# Patient Record
Sex: MALE | Race: WHITE | NOT HISPANIC OR LATINO | Employment: FULL TIME | URBAN - METROPOLITAN AREA
[De-identification: names, ages, dates, MRNs, and addresses within clinical notes are randomized per-mention and may not be internally consistent; named-entity substitution may affect disease eponyms.]

---

## 2020-02-02 ENCOUNTER — HOSPITAL ENCOUNTER (EMERGENCY)
Facility: CLINIC | Age: 55
Discharge: HOME | End: 2020-02-02
Attending: EMERGENCY MEDICINE | Admitting: EMERGENCY MEDICINE
Payer: COMMERCIAL

## 2020-02-02 VITALS — OXYGEN SATURATION: 100 % | SYSTOLIC BLOOD PRESSURE: 157 MMHG | TEMPERATURE: 98.3 F | DIASTOLIC BLOOD PRESSURE: 91 MMHG

## 2020-02-02 DIAGNOSIS — F43.10 PTSD (POST-TRAUMATIC STRESS DISORDER): ICD-10-CM

## 2020-02-02 DIAGNOSIS — F41.9 ANXIETY: ICD-10-CM

## 2020-02-02 PROCEDURE — 25000132 ZZH RX MED GY IP 250 OP 250 PS 637: Performed by: EMERGENCY MEDICINE

## 2020-02-02 PROCEDURE — 99283 EMERGENCY DEPT VISIT LOW MDM: CPT

## 2020-02-02 RX ORDER — CLONAZEPAM 0.5 MG/1
1 TABLET ORAL ONCE
Status: COMPLETED | OUTPATIENT
Start: 2020-02-02 | End: 2020-02-02

## 2020-02-02 RX ADMIN — CLONAZEPAM 1 MG: 0.5 TABLET ORAL at 16:36

## 2020-02-02 ASSESSMENT — ENCOUNTER SYMPTOMS
VOMITING: 0
DIARRHEA: 1
FEVER: 0

## 2020-02-02 NOTE — ED PROVIDER NOTES
History     Chief Complaint:  Medication Refill    HPI   Olman Condon is a 54 year old male who presents with request of refill of his Klonopin.  Patient has a history of anxiety and PTSD.  He is prescribed Klonopin 1 mg that he generally takes once a day as needed.  He ran out of his medications 3 days prior and has not had any Klonopin in the last 48 hours.  He states that he sometimes uses additional doses of the Klonopin to assist with symptoms.  He did this over the last 30 days thus running out of the medication early.  He does have a refill for which he can  but not until Thursday or 4 days from now.  He denies any other novel complaints.    Allergies:  Bee Venom  Meperidine    Medications:    Baclofen  Klonopin  Sertraline  Losartan  Meloxicam     Past Medical History:    Recurrent cold sores  Depression   Anxiety   ADD    Past Surgical History:    Appendectomy    Family History:    Stroke     Social History:  Marital Status:  Legally  [3]  Former smoker.   Current smokeless tobacco user.   Positive for rare alcohol use.   Negative for drug use.      Review of Systems   Constitutional: Negative for fever.   Gastrointestinal: Positive for diarrhea. Negative for vomiting.   All other systems reviewed and are negative.      Physical Exam     Patient Vitals for the past 24 hrs:   BP Temp Temp src Heart Rate SpO2   02/02/20 1603 (!) 157/91 -- -- -- --   02/02/20 1602 -- 98.3  F (36.8  C) Temporal 113 100 %      Physical Exam    General:   Pleasant, age appropriate.      Resting comfortably in the bed.  HEENT:    Oropharynx is moist.  Eyes:    Conjunctiva normal  Neck:    Supple, no meningismus.     CV:     Regular rate and rhythm.      No murmurs, rubs or gallops.    PULM:    Clear to auscultation bilateral.       No respiratory distress.      Good air exchange.  ABD:    Soft, non-tender, non-distended.       No rebound, guarding or rigidity.  MSK:     No gross deformity to all four  extremities.   LYMPH:   No cervical lymphadenopathy.  NEURO:   Alert and oriented x 3.      Speech is clear with no aphasia.     Normal muscular tone, no tremor.  Skin:    Warm, dry and intact.    Psych:    Mildly anxious     No delusions, hallucinations.     Judgement is appropriate.      Emergency Department Course   Interventions:  Klonopin, 1 mg, PO    Emergency Department Course:  I performed an exam of the patient, as described above.     Findings and plan explained to the Patient. Patient discharged home with instructions regarding supportive care, medications, and reasons to return. The importance of close follow-up was reviewed.     Impression & Plan      Medical Decision Making:    Olman Condon is a 54 year old male who presents with request of refill of his Klonopin as he has run out early and cannot get a refill for an additional 4 days.  I informed him that we do not refill these controlled medications from the ED and need to be refilled through his primary care physician.  I offered to give him a single dose in the ED to assist with his anxiety that is present.  He is comfortable calling his primary care physician for further direction tomorrow.    Diagnosis:    ICD-10-CM    1. Anxiety F41.9    2. PTSD (post-traumatic stress disorder) F43.10        Disposition:  discharged to home    Elsie Mica  2/2/2020   [facility] EMERGENCY DEPARTMENT       Vicente Tejada MD  02/02/20 0046

## 2020-02-02 NOTE — ED NOTES
"When RN entered room to provide PO medication, patient is upset with registration, telling her that the questions she is asking have nothing to do with why he is here today and refusing to answer questions.  Upon signing consent for treatment, patient has opted out of research, health information exchange, and sharing of his information by his insurance company.  Registration wants RN to explain to patient what all of these refusals mean.  Patient states \"I don't need her to tell me what they mean, I know all about HIPPA.\"  "

## 2020-02-02 NOTE — ED NOTES
"Upon having patient sign discharge instructions, the provider listed on patient's chart is not correct.  Patient states \"I want Elver off there, she was just a physical therapist that I saw for a back injury years ago.  I told that other lady that my doctor is Dr. Dean, and I even gave her his address.\"  RN approached registration and informed her that patient would like his provider name changed.  "

## 2020-02-02 NOTE — ED AVS SNAPSHOT
[facility] Emergency Department  [address]  [facility] [address]  Phone:  [phone]  Fax:  [phone]                                    Olman Condon   MRN: [id number]    Department:  [facility] Emergency Department   Date of Visit:  2/2/2020           After Visit Summary Signature Page    I have received my discharge instructions, and my questions have been answered. I have discussed any challenges I see with this plan with the nurse or doctor.    ..........................................................................................................................................  Patient/Patient Representative Signature      ..........................................................................................................................................  Patient Representative Print Name and Relationship to Patient    ..................................................               ................................................  Date                                   Time    ..........................................................................................................................................  Reviewed by Signature/Title    ...................................................              ..............................................  Date                                               Time          22EPIC Rev 08/18

## 2020-02-02 NOTE — ED TRIAGE NOTES
Pt here with request for 1 mg Klonipin daily for PTSD. Unable to see MD until tomorrow. No SI/HI. ABC intact.

## 2020-03-13 ENCOUNTER — NURSE TRIAGE (OUTPATIENT)
Dept: NURSING | Facility: CLINIC | Age: 55
End: 2020-03-13

## 2020-03-13 NOTE — TELEPHONE ENCOUNTER
"Lower lumbar spinal stenosis - sees surgeon tomorrow at [facility]. Also missing discs in back. Back pain 9/10 tonight - is unsure if he should go into ED tonight or wait and see the surgeon in the morning. Per protocol, advised ED evaluation. Patient unsure - will try to \"wait it out\" until morning.    Karen Valadez RN on 3/13/2020 at 1:13 AM    Reason for Disposition    [1] SEVERE abdominal pain AND [2] present > 1 hour    Additional Information    Negative: Passed out (i.e., lost consciousness, collapsed and was not responding)    Negative: Shock suspected (e.g., cold/pale/clammy skin, too weak to stand, low BP, rapid pulse)    Negative: Sounds like a life-threatening emergency to the triager    Negative: Major injury to the back (e.g., MVA, fall > 10 feet or 3 meters, penetrating injury, etc.)    Negative: Followed a tailbone injury    Negative: [1] Pain in the upper back over the ribs (rib cage) AND [2] radiates (travels, goes) into chest    Negative: [1] Pain in the upper back over the ribs (rib cage) AND [2] worsened by coughing (or clearly increases with breathing)    Negative: Back pain during pregnancy    Negative: Pain mainly in flank (i.e., in the side, over the lower ribs or just below the ribs)    Negative: [1] SEVERE back pain (e.g., excruciating) AND [2] sudden onset AND [3] age > 60    Negative: [1] Unable to urinate (or only a few drops) > 4 hours AND     [2] bladder feels very full (e.g., palpable bladder or strong urge to urinate)    Negative: [1] Urinary or bowel incontinence (i.e., loss of bladder or bowel control) AND [2] new onset    Negative: Numbness in groin or rectal area (i.e., loss of sensation)    Protocols used: BACK PAIN-A-AH      "

## 2020-04-15 ENCOUNTER — HOSPITAL ENCOUNTER (EMERGENCY)
Facility: CLINIC | Age: 55
Discharge: HOME | End: 2020-04-15
Attending: EMERGENCY MEDICINE | Admitting: EMERGENCY MEDICINE
Payer: COMMERCIAL

## 2020-04-15 ENCOUNTER — NURSE TRIAGE (OUTPATIENT)
Dept: NURSING | Facility: CLINIC | Age: 55
End: 2020-04-15

## 2020-04-15 VITALS
SYSTOLIC BLOOD PRESSURE: 159 MMHG | OXYGEN SATURATION: 100 % | HEART RATE: 86 BPM | RESPIRATION RATE: 18 BRPM | TEMPERATURE: 97.8 F | DIASTOLIC BLOOD PRESSURE: 110 MMHG | WEIGHT: 179.68 LBS | BODY MASS INDEX: 25.72 KG/M2 | HEIGHT: 70 IN

## 2020-04-15 DIAGNOSIS — M54.16 LUMBAR RADICULOPATHY: Primary | ICD-10-CM

## 2020-04-15 PROCEDURE — 96374 THER/PROPH/DIAG INJ IV PUSH: CPT

## 2020-04-15 PROCEDURE — 25000128 H RX IP 250 OP 636: Performed by: EMERGENCY MEDICINE

## 2020-04-15 PROCEDURE — 99284 EMERGENCY DEPT VISIT MOD MDM: CPT | Mod: 25

## 2020-04-15 PROCEDURE — 96375 TX/PRO/DX INJ NEW DRUG ADDON: CPT

## 2020-04-15 RX ORDER — PREDNISONE 20 MG/1
TABLET ORAL
Qty: 10 TABLET | Refills: 0 | Status: SHIPPED | OUTPATIENT
Start: 2020-04-15 | End: 2022-07-02

## 2020-04-15 RX ORDER — KETOROLAC TROMETHAMINE 15 MG/ML
15 INJECTION, SOLUTION INTRAMUSCULAR; INTRAVENOUS ONCE
Status: COMPLETED | OUTPATIENT
Start: 2020-04-15 | End: 2020-04-15

## 2020-04-15 RX ORDER — OXYCODONE HYDROCHLORIDE 5 MG/1
5 TABLET ORAL EVERY 6 HOURS PRN
Qty: 12 TABLET | Refills: 0 | Status: SHIPPED | OUTPATIENT
Start: 2020-04-15 | End: 2022-07-02

## 2020-04-15 RX ORDER — METHYLPREDNISOLONE SODIUM SUCCINATE 125 MG/2ML
125 INJECTION, POWDER, LYOPHILIZED, FOR SOLUTION INTRAMUSCULAR; INTRAVENOUS ONCE
Status: COMPLETED | OUTPATIENT
Start: 2020-04-15 | End: 2020-04-15

## 2020-04-15 RX ADMIN — METHYLPREDNISOLONE SODIUM SUCCINATE 125 MG: 125 INJECTION, POWDER, FOR SOLUTION INTRAMUSCULAR; INTRAVENOUS at 02:32

## 2020-04-15 RX ADMIN — KETOROLAC TROMETHAMINE 15 MG: 15 INJECTION, SOLUTION INTRAMUSCULAR; INTRAVENOUS at 02:31

## 2020-04-15 ASSESSMENT — MIFFLIN-ST. JEOR: SCORE: 1666.25

## 2020-04-15 NOTE — TELEPHONE ENCOUNTER
Patient called stating he has lower lumber stenosis.  He is 2 inches shorter today than he was about a month ago.     Reports he is in pain 10-11/10  Began tonight.  Thinking he may have hurt it tonight.  Reports that he jumped rope today with his boxing shoes on tips of toes until it bothered him a little bit.  Did 4 rounds on the bag, he felt great.  Took a bath.  Reports he is getting less than 4 hours per sleep per day for the past several weeks.     Unable to eat due to high pain. Reporting numbness in groin, calf area.  Per protocol advised to go to ED.  Patient verbalized understanding.     Teresa London RN/[facility] Nurse Advisors    COVID 19 Nurse Triage Plan/Patient Instructions    Please be aware that novel coronavirus (COVID-19) may be circulating in the community. If you develop symptoms such as fever, cough, or SOB or if you have concerns about the presence of another infection including coronavirus (COVID-19), please contact your health care provider or visit [email or web address].     Disposition/Instructions    Patient to go to ED and follow protocol based instructions. Follow System Ambulatory Workflow for COVID 19.     Bring Your Own Device:  Please also bring your smart device(s) (smart phones, tablets, laptops) and their charging cables for your personal use and to communicate with your care team during your visit.    Thank you for limiting contact with others, wearing a simple mask to cover your cough, practice good hand hygiene habits and accessing our virtual services where possible to limit the spread of this virus.    For more information about COVID19 and options for caring for yourself at home, please visit the CDC website at https://www.cdc.gov/coronavirus/2019-ncov/about/steps-when-sick.html  For more options for care at [facility], please visit our website at [email or web address]    For more information, please use the [facility]  ([facility]) COVID-19 Hotlines (Interpreters available):     Health questions: Phone Number: [phone] or [phone] and Hours: 7 a.m. to 7 p.m.    Schools and  questions: Phone Number: [phone] or [phone] and Hours 7 a.m. to 7 p.m.    Reason for Disposition    Numbness in groin or rectal area (i.e., loss of sensation)    Additional Information    Negative: Passed out (i.e., lost consciousness, collapsed and was not responding)    Negative: Shock suspected (e.g., cold/pale/clammy skin, too weak to stand, low BP, rapid pulse)    Negative: Sounds like a life-threatening emergency to the triager    Negative: Major injury to the back (e.g., MVA, fall > 10 feet or 3 meters, penetrating injury, etc.)    Negative: Followed a tailbone injury    Negative: [1] Pain in the upper back over the ribs (rib cage) AND [2] radiates (travels, goes) into chest    Negative: [1] Pain in the upper back over the ribs (rib cage) AND [2] worsened by coughing (or clearly increases with breathing)    Negative: Back pain during pregnancy    Negative: Pain mainly in flank (i.e., in the side, over the lower ribs or just below the ribs)    Negative: [1] SEVERE back pain (e.g., excruciating) AND [2] sudden onset AND [3] age > 60    Negative: [1] Unable to urinate (or only a few drops) > 4 hours AND     [2] bladder feels very full (e.g., palpable bladder or strong urge to urinate)    Negative: [1] Urinary or bowel incontinence (i.e., loss of bladder or bowel control) AND [2] new onset    Protocols used: BACK PAIN-A-

## 2020-04-15 NOTE — ED AVS SNAPSHOT
[facility] Emergency Department  [address]  [facility] [address]  Phone:  [phone]  Fax:  [phone]                                    Olman Condon   MRN: [id number]    Department:  [facility] Emergency Department   Date of Visit:  4/15/2020           After Visit Summary Signature Page    I have received my discharge instructions, and my questions have been answered. I have discussed any challenges I see with this plan with the nurse or doctor.    ..........................................................................................................................................  Patient/Patient Representative Signature      ..........................................................................................................................................  Patient Representative Print Name and Relationship to Patient    ..................................................               ................................................  Date                                   Time    ..........................................................................................................................................  Reviewed by Signature/Title    ...................................................              ..............................................  Date                                               Time          22EPIC Rev 08/18

## 2020-04-15 NOTE — ED TRIAGE NOTES
Here for mid lower back pain with numbness down leg. Was working with the punching bag tonight. History of L4-S1 injury. Denies any loss of bowel/bladder control. ABCs intact.

## 2020-04-15 NOTE — ED PROVIDER NOTES
"  History   Chief Complaint:  Back Pain     HPI   Olman Condon is a 54 year old male with history of chronic low back pain who presents with increased low back pain radiating to the left groin with some numbness after doing 4 rounds of boxing with a bag at home. He was supposed to have back surgery at [place] and cancelled due to Covid pandemic. He received a steroid injection instead of surgery at that point. He has been trying to see his own doctor and the back surgeon for last 4 weeks, and he feels that they are not paying attention to him. He used tylenol tonight at midnight. He also took 800mg of advil at 4pm for pain. He denies any direct trauma to his back tonight. He denies any bowel or bladder incontinence.    Allergies:  Meperidine  Bupropion    Medications:   Klonopin  Catapres  Medrol  Zoloft   Trazodone  Zyprexa  Roxicodone     Past Medical History:    Concussion  Depression  Chronic low back pain   Carcinoma in situ of other specified sites, bones     Past Surgical History:    Appendectomy      Family History:    The patient denies any relevant family medical history.     Social History:  The patient was unaccompanied to the ED.  Smoking Status: Former  Smokeless Tobacco: Current  Alcohol Use: Yes  Drug Use: No    Review of Systems  Please see HPI. All other systems reviewed and negative.    Physical Exam     Patient Vitals for the past 24 hrs:   BP Temp Temp src Pulse Heart Rate Resp SpO2 Height Weight   04/15/20 0156 (!) 134/101 97.8  F (36.6  C) Oral 97 97 18 100 % 1.778 m (5' 10\") 81.5 kg (179 lb 10.8 oz)     Physical Exam  Constitutional:       Appearance: He is well-developed.   Cardiovascular:      Rate and Rhythm: Normal rate and regular rhythm.      Heart sounds: Normal heart sounds. No murmur. No friction rub. No gallop.    Pulmonary:      Effort: Pulmonary effort is normal. No respiratory distress.      Breath sounds: Normal breath sounds. No wheezing or rales.   Abdominal:      " General: Bowel sounds are normal. There is no distension.      Palpations: Abdomen is soft. There is no mass.      Tenderness: There is no abdominal tenderness.   Musculoskeletal: Normal range of motion.         General: Tenderness present.      Comments: Mild TTP lowe lumbar spine in the midline and sightly to the left. ROM normal. Straight leg test negative on left leg. Pain only in groin on exam. 2+ pulses BLE. 5/5 strength in BLE   Skin:     General: Skin is warm and dry.      Capillary Refill: Capillary refill takes less than 2 seconds.      Findings: No rash.   Neurological:      Mental Status: He is alert.      Deep Tendon Reflexes: Reflexes normal.         Emergency Department Course   Interventions:  0231 Toradol 15 mg IV  0232 Solu-medrol 125 mg IV    Emergency Department Course:  Past medical records, nursing notes, and vitals reviewed.    (0204)   I performed an exam of the patient as documented above. History obtained from patient.     (0300)   I rechecked the patient and discussed results and plan of care.     Findings and plan explained to the Patient. Patient discharged home with instructions regarding supportive care, medications, and reasons to return. The importance of close follow-up was reviewed. The patient was prescribed Deltasone and Roxicodone. I personally answered all related questions prior to discharge.     Impression & Plan   Medical Decision Making:  Patient present with above symptoms. He is also manic on exam but states he knows this and is because of pain. He drove himself and is unable to receive any medicatios other than solumedrol and toradol here. I've discussed with him that he needs to see pain management, ortho spine, PT and his own doctor. We are unable to provide any other form of pain control at this point. He has no neurologic finding on exam that warrants emergent imaging. Patient is discharged at his own request after receiving these medications.    Diagnosis:     ICD-10-CM    1. Lumbar radiculopathy  M54.16 [facility]     Disposition:  Discharged to home.    Discharge Medications:     Medication List      Started    oxyCODONE 5 MG tablet  Commonly known as:  ROXICODONE  5 mg, Oral, EVERY 6 HOURS PRN     predniSONE 20 MG tablet  Commonly known as:  DELTASONE  Take two tablets (= 40mg) each day for 5 (five) days             Scribe Disclosure:  Michael SOSA, am serving as a scribe at 2:01 AM on 4/15/2020 to document services personally performed by Mariluz Fitzgerald MD based on my observations and the provider's statements to me.  April 15, 2020   [facility] EMERGENCY DEPARTMENT        Mariluz Fitzgerald MD  04/15/20 0328

## 2020-04-15 NOTE — DISCHARGE INSTRUCTIONS
Follow up with your orthopedic spine specialist  You have been referred to pain  management and physical therapy  Continue tylenol and advil  We will add steroid to help

## 2020-04-15 NOTE — ED NOTES
"Pt says \"this aint working I want to go\", \"can you get me the medications she's going to prescribe?\". MD aware. Tubing Rx to pharmacy.  "

## 2020-04-16 ENCOUNTER — VIRTUAL VISIT (OUTPATIENT)
Dept: PHYSICAL THERAPY | Facility: CLINIC | Age: 55
End: 2020-04-16
Attending: EMERGENCY MEDICINE
Payer: COMMERCIAL

## 2020-04-16 DIAGNOSIS — M54.42 BILATERAL LOW BACK PAIN WITH LEFT-SIDED SCIATICA: ICD-10-CM

## 2020-04-16 DIAGNOSIS — M54.16 LUMBAR RADICULOPATHY: ICD-10-CM

## 2020-04-16 PROCEDURE — 97110 THERAPEUTIC EXERCISES: CPT | Mod: GT | Performed by: PHYSICAL THERAPIST

## 2020-04-16 PROCEDURE — 97162 PT EVAL MOD COMPLEX 30 MIN: CPT | Mod: GT | Performed by: PHYSICAL THERAPIST

## 2020-04-16 NOTE — PROGRESS NOTES
"Physical Therapy Virtual Initial Visit      The patient has been notified of following:     \"This virtual visit will be conducted between you and your provider. We have found that certain health care needs can be provided without the need for physical presence.  This service lets us provide the care you need with a virtual visit.\"    Due to external, as well as internal [employer] management of the COVID-19 Virus, Olman Condon was not seen in our clinic.  As a substitution, we implemented a virtual visit to manage this patient's condition utilizing the PTRx virtual visit platform via the patient s existing code.  The provider, Rowena Wray, reviewed the patient's chart, PTRx prescription, and spoke with the patient to determine the following telemedicine visit is appropriate and effective for the patient's care.    The following type of visit was completed:   Video Visit:  The [employer] platform uses a synchronous HIPAA compliant video stream for this patient encounter.         Subjective:  The history is provided by the patient. No  was used.   Therapist Generated HPI Evaluation  Problem details: Chronic low back pain since 2012, ER visit on 4-15-20 with onset of left LE symptoms  Pain is increased with sitting and walking. Currently using cane to walk.  Feels better lying down. 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         Type of problem:  Lumbar.    This is a chronic condition.  Condition occurred with:  Other reason.  Where condition occurred: during recreation/sport (boxing).  Patient reports pain:  Central lumbar spine and lumbar spine left.  Pain is described as aching and shooting and is intermittent.  Pain radiates to:  Gluteals left and thigh left. Pain is worse in the P.M..  Since onset symptoms are unchanged.  Associated symptoms:  Loss of motion/stiffness. Symptoms are exacerbated by bending, sitting and walking  and relieved by other, heat and " analgesics.  Special tests included:  X-ray (per patient stenosis and ddd).    Restrictions due to condition include:  Currently not working due to present treatment.  Barriers include:  None as reported by patient.    Patient Health History  Symptom: low back pain with radiating left      Date of Onset: 4-15-20, incr pain, chronic since 2012.   HPI: Documentation: this episode with boxing.   Pain is reported as 5/10 on pain scale.  General health as reported by patient is good.  Pertinent medical history includes: mental illness and other (anxiety and schizophrenia).   Red flags:  Bilateral numbness.         Current medications:  Pain medication, steroids and other (anxiety meds).                                     Objective:    System       Lumbar/SI Evaluation  ROM:    AROM Lumbar:   Flexion:          To lower leg pdm and radiating left leg  Ext:                    25% pdm   Side Bend:        Left:     Right:   Rotation:           Left:     Right:   Side Glide:        Left:  25%    Right:  75% pain          Lumbar Myotomes:  not assessed                  Neural Tension/Mobility:      Left side:SLR w/DF  negative.     Right side:   SLR w/DF  negative.                                                      General   ROS    PTRx Content from today's visit:  Exercise Name: Virtual Visit Tips for Patients  Exercise Name: Neutral Spine Slouch Overcorrect  Exercise Name: 90/90 Position, Sets: 1, Notes: 15 minutes 2x daily  Exercise Name: All 4s Cat Cow, Sets: 1 - Reps: 10 - Sessions: 6, Notes: perform is sitting and 4 point mid range partial range of motion , this should not increase leg symptoms      Assessment/Plan:     Patient is a 54 year old male with lumbar complaints.    Patient has the following significant findings with corresponding treatment plan.                Diagnosis 1:  Low back pain  Pain -  hot/cold therapy, self management, education and home program  Decreased ROM/flexibility - therapeutic exercise  and home program  Decreased strength - therapeutic exercise, therapeutic activities and home program  Impaired gait - gait training and home program  Decreased function - therapeutic activities and home program  Impaired posture - neuro re-education and home program    Therapy Evaluation Codes:   1) History comprised of:   Personal factors that impact the plan of care:      Anxiety, Coping style, Overall behavior pattern and Past/current experiences.    Comorbidity factors that impact the plan of care are:      Mental illness and Numbness/tingling.     Medications impacting care: Pain and Steroids.  2) Examination of Body Systems comprised of:   Body structures and functions that impact the plan of care:      Lumbar spine.   Activity limitations that impact the plan of care are:      Bending, Sitting, Stairs, Standing and Walking.  3) Clinical presentation characteristics are:   Evolving/Changing.  4) Decision-Making    Moderate complexity using standardized patient assessment instrument and/or measureable assessment of functional outcome.  Cumulative Therapy Evaluation is: Moderate complexity.    Previous and current functional limitations:  (See Goal Flow Sheet for this information)    Short term and Long term goals: (See Goal Flow Sheet for this information)     Communication ability:  Patient appears to be able to clearly communicate and understand verbal and written communication and follow directions correctly.  Treatment Explanation - The following has been discussed with the patient:   RX ordered/plan of care  Anticipated outcomes  Possible risks and side effects  This patient would benefit from PT intervention to resume normal activities.   Rehab potential is good.    Frequency:  1 X week, once daily  Duration:  for 8 weeks  Discharge Plan:  Achieve all LTG.  Independent in home treatment program.  Reach maximal therapeutic benefit.    Please refer to the daily flowsheet for treatment today, total treatment  time and time spent performing 1:1 timed codes.       Virtual visit contact time    Time of service began: 2:00 PM  Time of service ended: 2:44 PM  Total Time for set up, visit, and documentation: 60 minutes    Payor: LEANN / Plan: LEANN OPEN ACCESS / Product Type: HMO /     Procedure Code/s   Therapeutic Exercise (63589): 20 minutes  Evaluation: 24 minutes    I have reviewed the note as documented above.  This accurately captures the substance of my conversation with the patient.  Provider location: [facility] ([facility]State)  Patient location: Home    ___________________________________________________

## 2020-04-19 ENCOUNTER — HOSPITAL ENCOUNTER (EMERGENCY)
Facility: CLINIC | Age: 55
Discharge: HOME | End: 2020-04-19
Attending: EMERGENCY MEDICINE | Admitting: EMERGENCY MEDICINE
Payer: COMMERCIAL

## 2020-04-19 VITALS
TEMPERATURE: 97.2 F | DIASTOLIC BLOOD PRESSURE: 118 MMHG | RESPIRATION RATE: 16 BRPM | SYSTOLIC BLOOD PRESSURE: 135 MMHG | HEART RATE: 107 BPM | OXYGEN SATURATION: 95 %

## 2020-04-19 DIAGNOSIS — M54.9 CHRONIC BACK PAIN, UNSPECIFIED BACK LOCATION, UNSPECIFIED BACK PAIN LATERALITY: ICD-10-CM

## 2020-04-19 DIAGNOSIS — G89.29 CHRONIC BACK PAIN, UNSPECIFIED BACK LOCATION, UNSPECIFIED BACK PAIN LATERALITY: ICD-10-CM

## 2020-04-19 PROCEDURE — 25000132 ZZH RX MED GY IP 250 OP 250 PS 637: Performed by: EMERGENCY MEDICINE

## 2020-04-19 PROCEDURE — 99283 EMERGENCY DEPT VISIT LOW MDM: CPT

## 2020-04-19 RX ORDER — DIAZEPAM 5 MG
5 TABLET ORAL ONCE
Status: COMPLETED | OUTPATIENT
Start: 2020-04-19 | End: 2020-04-19

## 2020-04-19 RX ORDER — HYDROMORPHONE HYDROCHLORIDE 2 MG/1
2 TABLET ORAL ONCE
Status: COMPLETED | OUTPATIENT
Start: 2020-04-19 | End: 2020-04-19

## 2020-04-19 RX ORDER — CYCLOBENZAPRINE HCL 10 MG
10 TABLET ORAL 3 TIMES DAILY PRN
Qty: 20 TABLET | Refills: 0 | Status: SHIPPED | OUTPATIENT
Start: 2020-04-19 | End: 2020-04-25

## 2020-04-19 RX ORDER — IBUPROFEN 600 MG/1
600 TABLET, FILM COATED ORAL ONCE
Status: COMPLETED | OUTPATIENT
Start: 2020-04-19 | End: 2020-04-19

## 2020-04-19 RX ORDER — METHYLPREDNISOLONE 4 MG
TABLET, DOSE PACK ORAL
Qty: 21 TABLET | Refills: 0 | Status: SHIPPED | OUTPATIENT
Start: 2020-04-19 | End: 2022-07-02

## 2020-04-19 RX ADMIN — DIAZEPAM 5 MG: 5 TABLET ORAL at 05:47

## 2020-04-19 RX ADMIN — HYDROMORPHONE HYDROCHLORIDE 2 MG: 2 TABLET ORAL at 05:47

## 2020-04-19 RX ADMIN — IBUPROFEN 600 MG: 600 TABLET ORAL at 05:47

## 2020-04-19 ASSESSMENT — ENCOUNTER SYMPTOMS
MYALGIAS: 1
NUMBNESS: 1
BACK PAIN: 1

## 2020-04-19 NOTE — ED AVS SNAPSHOT
[facility] Emergency Department  [address]  [facility] [address]  Phone:  [phone]  Fax:  [phone]                                    Olman Condon   MRN: [id number]    Department:  [facility] Emergency Department   Date of Visit:  4/19/2020           After Visit Summary Signature Page    I have received my discharge instructions, and my questions have been answered. I have discussed any challenges I see with this plan with the nurse or doctor.    ..........................................................................................................................................  Patient/Patient Representative Signature      ..........................................................................................................................................  Patient Representative Print Name and Relationship to Patient    ..................................................               ................................................  Date                                   Time    ..........................................................................................................................................  Reviewed by Signature/Title    ...................................................              ..............................................  Date                                               Time          22EPIC Rev 08/18

## 2020-04-19 NOTE — ED PROVIDER NOTES
History     Chief Complaint:  Back Pain      HPI   Olman Condon is a 54 year old male with a history of lumber stenosis who presents with back pain. Per chart review the patient was here on 4/15 with low back pain with pain radiating to his left groin with numbness and he was discharged with Oxycodone and Deltasone. Today in the ED, the patient says that the back pain, weakness, and numbness has been getting worse. He says that the back pain radiated to his left leg, and his right leg has been getting numb below the knees. He notes that he has been trying to see his own doctor and the back surgeon for last 4 weeks, and he feels that they are not paying attention to him. He also says that he was supposed to have surgery on back, but it was canceled due to COVID and he got steroid injection instead. He rates his current pain at a 10/10. He denies any bowel or bladder incontinence.       Allergies:  Bee venom  Meperidine   Bupropion       Medications:    Trazodone  Zoloft  Deltasone  Roxicodone  Medrol Dosepak  Catapres  Klonopin   Voltaren   Zyprexa    Desyrel   Cozaar     Past Medical History:    Concussion   Depressive disorder  Lumbar stenosis   Bilateral low back pain with left sided sciatica   Bipolar 2 disorder  Hypertension   Osteoarthritis   ADD  Malignant neoplasm of skin   EDIE  rheumatoid arthritis     Past Surgical History:    Appendectomy    Ankle fracture treatment     Family History:    Heart attack  Nose cancer  Colon cancer  Prostate cancer  Abdominal cancer     Social History:  Smoking Status: Former Smoker  Smokeless Tobacco: Current User  Alcohol Use: Negative   Drug Use: Negative  Marital Status:        Review of Systems   Musculoskeletal: Positive for back pain and myalgias.   Neurological: Positive for numbness.   All other systems reviewed and are negative.        Physical Exam     Patient Vitals for the past 24 hrs:   BP Temp Pulse Resp SpO2   04/19/20 0523 (!) 135/118 -- --  -- --   04/19/20 0522 -- 97.2  F (36.2  C) 107 16 95 %        Physical Exam  Constitutional:  Oriented to person, place, and time. Anxious   HENT:   Head:    Normocephalic.   Mouth/Throat:   Oropharynx is clear and moist.   Eyes:    EOM are normal. Pupils are equal, round, and reactive to light.   Neck:    Neck supple.   Cardiovascular:  Normal rate, regular rhythm and normal heart sounds.      Exam reveals no gallop and no friction rub.       No murmur heard.  Pulmonary/Chest:  Effort normal and breath sounds normal.      No respiratory distress. No wheezes. No rales.      No reproducible chest wall pain.  Abdominal:   Soft. No distension. No tenderness. No rebound and no guarding.   Musculoskeletal:  Normal range of motion. 2+ to pulses no midline spinal tenderness. Straight leg raise negative.  Neurological:   Alert and oriented to person, place, and time.           Moves all 4 extremities spontaneously . 5/5 strength in all 4 extremities.  Sensation intact to light touch in all 4 extremities.   Skin:    No rash noted. No pallor.     Emergency Department Course     Interventions:  0547 Dilaudid 2 mg Oral   Ibuprofen  600 mg Oral   Valium 5 mg Oral     Emergency Department Course:    0529 Nursing notes and vitals reviewed.    0533 I performed an exam of the patient as documented above.      The patient is discharged to home.       Impression & Plan      Medical Decision Making:  Olman Condon is a 54 year old male who presents for evaluation of back pain and radicular symptoms. They have a history of back pain in the past. The pain has improved with interventions in the ED. The patient did not sustain any trauma, therefore x-rays are not necessary due to the low likelihood of fracture or subluxation. Advanced imaging with CT/MRI is not indicated at this time, but may be indicated in the future if symptoms fail to resolve. Nor is there any indication for consultation with neurosurgery or orthopedic spinal  surgeon. The patient has not had a fever, saddle/perineal anesthesia, bilateral foot numbness, or bowel or bladder dysfunction. There is no clinical evidence of cauda equina syndrome, discitis, spinal/epidural space hematoma or epidural abscess. The neurological exam is normal, with the exception of the dermatomal symptoms noted herein. The patient was advised that radiculopathy often takes significant time to resolve, and that follow up with primary care, neurology and/or neurosurgery will be indicated if symptoms do not improve. The patient will be discharged with pain medications to use as directed. No heavy lifting, bending or twisting. Return if increasing pain, muscular weakness, or bowel or bladder dysfunction.       Diagnosis:    ICD-10-CM    1. Chronic back pain, unspecified back location, unspecified back pain laterality  M54.9     G89.29      Disposition:   Findings and plan explained to the Patient. Patient discharged home with instructions regarding supportive care, medications, and reasons to return. The importance of close follow-up was reviewed.     Discharge Medications:  Discharge Medication List as of 4/19/2020  5:51 AM      START taking these medications    Details   cyclobenzaprine (FLEXERIL) 10 MG tablet Take 1 tablet (10 mg) by mouth 3 times daily as needed for muscle spasms, Disp-20 tablet,R-0, Local Print      !! methylPREDNISolone (MEDROL DOSEPAK) 4 MG tablet therapy pack Follow Package Directions, Disp-21 tablet,R-0, Local Print       !! - Potential duplicate medications found. Please discuss with provider.          Scribe Disclosure:  I, Carmelo Mark, am serving as a scribe at 5:38 AM on 4/19/2020 to document services personally performed by Jose A Huerta MD based on my observations and the provider's statements to me.       [facility] EMERGENCY DEPARTMENT       Jose A Huerta MD  04/19/20 6525

## 2020-04-19 NOTE — ED TRIAGE NOTES
Pt in with C/O lower lumbar pain radiating to the LLE. Pt reports hx of lumbar stenosis. Pt reports no new injury, but increasing pain and numbness to the lower extremity. Pt reports his surgeon will not give him mediation to manage pain.

## 2020-04-19 NOTE — ED NOTES
"Pt called on 04/19/2020 at 0830 to report the medications prescribed for him in ED are not effective.   Dr Macias consulted and offered rx of Robaxin and another ED visit for further evaluation.  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   What kind of place is that?   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  He asked for my last name and I declined.  He then stated \"I am going to go out today and get 2 jobs and one of them will be yours\".  He then hung up phone.    "

## 2020-04-23 ENCOUNTER — TELEPHONE (OUTPATIENT)
Dept: PHYSICAL THERAPY | Facility: CLINIC | Age: 55
End: 2020-04-23

## 2020-04-23 DIAGNOSIS — M54.42 BILATERAL LOW BACK PAIN WITH LEFT-SIDED SCIATICA: ICD-10-CM

## 2020-11-17 NOTE — PROGRESS NOTES
Pt did not return or answer phone for follow up. He will be discharged from PT. Refer to initial evaluation.

## 2022-07-02 ENCOUNTER — HOSPITAL ENCOUNTER (INPATIENT)
Facility: CLINIC | Age: 57
LOS: 2 days | Discharge: HOME | DRG: 439 | End: 2022-07-04
Attending: EMERGENCY MEDICINE | Admitting: INTERNAL MEDICINE
Payer: COMMERCIAL

## 2022-07-02 DIAGNOSIS — K85.20 ALCOHOL-INDUCED ACUTE PANCREATITIS, UNSPECIFIED COMPLICATION STATUS: ICD-10-CM

## 2022-07-02 DIAGNOSIS — E83.52 HYPERCALCEMIA: ICD-10-CM

## 2022-07-02 DIAGNOSIS — E87.6 HYPOKALEMIA: ICD-10-CM

## 2022-07-02 DIAGNOSIS — N17.9 AKI (ACUTE KIDNEY INJURY) (H): ICD-10-CM

## 2022-07-02 DIAGNOSIS — I10 ESSENTIAL HYPERTENSION: Primary | ICD-10-CM

## 2022-07-02 DIAGNOSIS — F10.930 ALCOHOL WITHDRAWAL SYNDROME WITHOUT COMPLICATION (H): ICD-10-CM

## 2022-07-02 LAB
ALBUMIN SERPL-MCNC: 3.3 G/DL (ref 3.4–5)
ALP SERPL-CCNC: 94 U/L (ref 40–150)
ALT SERPL W P-5'-P-CCNC: 99 U/L (ref 0–70)
ANION GAP SERPL CALCULATED.3IONS-SCNC: 9 MMOL/L (ref 3–14)
AST SERPL W P-5'-P-CCNC: 91 U/L (ref 0–45)
BASOPHILS # BLD AUTO: 0 10E3/UL (ref 0–0.2)
BASOPHILS NFR BLD AUTO: 0 %
BILIRUB SERPL-MCNC: 0.9 MG/DL (ref 0.2–1.3)
BUN SERPL-MCNC: 24 MG/DL (ref 7–30)
CALCIUM SERPL-MCNC: 12.6 MG/DL (ref 8.5–10.1)
CHLORIDE BLD-SCNC: 92 MMOL/L (ref 94–109)
CO2 SERPL-SCNC: 30 MMOL/L (ref 20–32)
CREAT SERPL-MCNC: 2.04 MG/DL (ref 0.66–1.25)
EOSINOPHIL # BLD AUTO: 0 10E3/UL (ref 0–0.7)
EOSINOPHIL NFR BLD AUTO: 0 %
ERYTHROCYTE [DISTWIDTH] IN BLOOD BY AUTOMATED COUNT: 11.9 % (ref 10–15)
ETHANOL SERPL-MCNC: <0.01 G/DL
GFR SERPL CREATININE-BSD FRML MDRD: 38 ML/MIN/1.73M2
GLUCOSE BLD-MCNC: 118 MG/DL (ref 70–99)
HCT VFR BLD AUTO: 37.3 % (ref 40–53)
HGB BLD-MCNC: 13 G/DL (ref 13.3–17.7)
HOLD SPECIMEN: NORMAL
IMM GRANULOCYTES # BLD: 0.1 10E3/UL
IMM GRANULOCYTES NFR BLD: 1 %
LIPASE SERPL-CCNC: 830 U/L (ref 73–393)
LYMPHOCYTES # BLD AUTO: 0.9 10E3/UL (ref 0.8–5.3)
LYMPHOCYTES NFR BLD AUTO: 6 %
MAGNESIUM SERPL-MCNC: 1.8 MG/DL (ref 1.6–2.3)
MAGNESIUM SERPL-MCNC: 1.8 MG/DL (ref 1.6–2.3)
MCH RBC QN AUTO: 31.9 PG (ref 26.5–33)
MCHC RBC AUTO-ENTMCNC: 34.9 G/DL (ref 31.5–36.5)
MCV RBC AUTO: 92 FL (ref 78–100)
MONOCYTES # BLD AUTO: 0.7 10E3/UL (ref 0–1.3)
MONOCYTES NFR BLD AUTO: 5 %
NEUTROPHILS # BLD AUTO: 12.9 10E3/UL (ref 1.6–8.3)
NEUTROPHILS NFR BLD AUTO: 88 %
NRBC # BLD AUTO: 0 10E3/UL
NRBC BLD AUTO-RTO: 0 /100
PHOSPHATE SERPL-MCNC: 3.6 MG/DL (ref 2.5–4.5)
PLATELET # BLD AUTO: 362 10E3/UL (ref 150–450)
POTASSIUM BLD-SCNC: 3 MMOL/L (ref 3.4–5.3)
PROT SERPL-MCNC: 7.1 G/DL (ref 6.8–8.8)
RBC # BLD AUTO: 4.07 10E6/UL (ref 4.4–5.9)
SARS-COV-2 RNA RESP QL NAA+PROBE: NEGATIVE
SODIUM SERPL-SCNC: 131 MMOL/L (ref 133–144)
WBC # BLD AUTO: 14.7 10E3/UL (ref 4–11)

## 2022-07-02 PROCEDURE — 99223 1ST HOSP IP/OBS HIGH 75: CPT | Mod: AI | Performed by: INTERNAL MEDICINE

## 2022-07-02 PROCEDURE — 85025 COMPLETE CBC W/AUTO DIFF WBC: CPT | Performed by: EMERGENCY MEDICINE

## 2022-07-02 PROCEDURE — 250N000011 HC RX IP 250 OP 636: Performed by: INTERNAL MEDICINE

## 2022-07-02 PROCEDURE — 96375 TX/PRO/DX INJ NEW DRUG ADDON: CPT

## 2022-07-02 PROCEDURE — 258N000003 HC RX IP 258 OP 636: Performed by: INTERNAL MEDICINE

## 2022-07-02 PROCEDURE — 258N000003 HC RX IP 258 OP 636: Performed by: EMERGENCY MEDICINE

## 2022-07-02 PROCEDURE — 250N000009 HC RX 250: Performed by: EMERGENCY MEDICINE

## 2022-07-02 PROCEDURE — 250N000013 HC RX MED GY IP 250 OP 250 PS 637: Performed by: EMERGENCY MEDICINE

## 2022-07-02 PROCEDURE — 82077 ASSAY SPEC XCP UR&BREATH IA: CPT | Performed by: EMERGENCY MEDICINE

## 2022-07-02 PROCEDURE — 87506 IADNA-DNA/RNA PROBE TQ 6-11: CPT | Performed by: INTERNAL MEDICINE

## 2022-07-02 PROCEDURE — 83735 ASSAY OF MAGNESIUM: CPT | Performed by: INTERNAL MEDICINE

## 2022-07-02 PROCEDURE — 83970 ASSAY OF PARATHORMONE: CPT | Performed by: INTERNAL MEDICINE

## 2022-07-02 PROCEDURE — 99285 EMERGENCY DEPT VISIT HI MDM: CPT | Mod: 25

## 2022-07-02 PROCEDURE — HZ2ZZZZ DETOXIFICATION SERVICES FOR SUBSTANCE ABUSE TREATMENT: ICD-10-PCS | Performed by: EMERGENCY MEDICINE

## 2022-07-02 PROCEDURE — 36415 COLL VENOUS BLD VENIPUNCTURE: CPT | Performed by: EMERGENCY MEDICINE

## 2022-07-02 PROCEDURE — 80053 COMPREHEN METABOLIC PANEL: CPT | Performed by: EMERGENCY MEDICINE

## 2022-07-02 PROCEDURE — 120N000001 HC R&B MED SURG/OB

## 2022-07-02 PROCEDURE — 250N000013 HC RX MED GY IP 250 OP 250 PS 637: Performed by: INTERNAL MEDICINE

## 2022-07-02 PROCEDURE — 250N000011 HC RX IP 250 OP 636: Performed by: EMERGENCY MEDICINE

## 2022-07-02 PROCEDURE — U0003 INFECTIOUS AGENT DETECTION BY NUCLEIC ACID (DNA OR RNA); SEVERE ACUTE RESPIRATORY SYNDROME CORONAVIRUS 2 (SARS-COV-2) (CORONAVIRUS DISEASE [COVID-19]), AMPLIFIED PROBE TECHNIQUE, MAKING USE OF HIGH THROUGHPUT TECHNOLOGIES AS DESCRIBED BY CMS-2020-01-R: HCPCS | Performed by: EMERGENCY MEDICINE

## 2022-07-02 PROCEDURE — C9803 HOPD COVID-19 SPEC COLLECT: HCPCS

## 2022-07-02 PROCEDURE — 96374 THER/PROPH/DIAG INJ IV PUSH: CPT

## 2022-07-02 PROCEDURE — 84100 ASSAY OF PHOSPHORUS: CPT | Performed by: INTERNAL MEDICINE

## 2022-07-02 PROCEDURE — 83690 ASSAY OF LIPASE: CPT | Performed by: EMERGENCY MEDICINE

## 2022-07-02 RX ORDER — NALOXONE HYDROCHLORIDE 0.4 MG/ML
0.4 INJECTION, SOLUTION INTRAMUSCULAR; INTRAVENOUS; SUBCUTANEOUS
Status: DISCONTINUED | OUTPATIENT
Start: 2022-07-02 | End: 2022-07-04 | Stop reason: HOSPADM

## 2022-07-02 RX ORDER — MELOXICAM 15 MG/1
15 TABLET ORAL DAILY PRN
COMMUNITY
End: 2022-07-04

## 2022-07-02 RX ORDER — CHLORTHALIDONE 25 MG/1
25 TABLET ORAL DAILY
COMMUNITY
End: 2022-07-04

## 2022-07-02 RX ORDER — POTASSIUM CHLORIDE 7.45 MG/ML
10 INJECTION INTRAVENOUS ONCE
Status: DISCONTINUED | OUTPATIENT
Start: 2022-07-02 | End: 2022-07-02

## 2022-07-02 RX ORDER — NALOXONE HYDROCHLORIDE 0.4 MG/ML
0.2 INJECTION, SOLUTION INTRAMUSCULAR; INTRAVENOUS; SUBCUTANEOUS
Status: DISCONTINUED | OUTPATIENT
Start: 2022-07-02 | End: 2022-07-04 | Stop reason: HOSPADM

## 2022-07-02 RX ORDER — DICYCLOMINE HYDROCHLORIDE 10 MG/1
20 CAPSULE ORAL ONCE
Status: COMPLETED | OUTPATIENT
Start: 2022-07-02 | End: 2022-07-02

## 2022-07-02 RX ORDER — QUETIAPINE FUMARATE 50 MG/1
100 TABLET, FILM COATED ORAL AT BEDTIME
Status: DISCONTINUED | OUTPATIENT
Start: 2022-07-02 | End: 2022-07-04 | Stop reason: HOSPADM

## 2022-07-02 RX ORDER — HYDROMORPHONE HYDROCHLORIDE 1 MG/ML
0.3 INJECTION, SOLUTION INTRAMUSCULAR; INTRAVENOUS; SUBCUTANEOUS
Status: DISCONTINUED | OUTPATIENT
Start: 2022-07-02 | End: 2022-07-04

## 2022-07-02 RX ORDER — HYDRALAZINE HYDROCHLORIDE 20 MG/ML
10 INJECTION INTRAMUSCULAR; INTRAVENOUS EVERY 6 HOURS PRN
Status: DISCONTINUED | OUTPATIENT
Start: 2022-07-02 | End: 2022-07-04 | Stop reason: HOSPADM

## 2022-07-02 RX ORDER — QUETIAPINE FUMARATE 25 MG/1
25 TABLET, FILM COATED ORAL 2 TIMES DAILY PRN
COMMUNITY
End: 2022-11-05

## 2022-07-02 RX ORDER — DIAZEPAM 10 MG/2ML
5 INJECTION, SOLUTION INTRAMUSCULAR; INTRAVENOUS ONCE
Status: COMPLETED | OUTPATIENT
Start: 2022-07-02 | End: 2022-07-02

## 2022-07-02 RX ORDER — MULTIPLE VITAMINS W/ MINERALS TAB 9MG-400MCG
1 TAB ORAL DAILY
Status: DISCONTINUED | OUTPATIENT
Start: 2022-07-03 | End: 2022-07-04 | Stop reason: HOSPADM

## 2022-07-02 RX ORDER — LORAZEPAM 2 MG/ML
1-2 INJECTION INTRAMUSCULAR EVERY 30 MIN PRN
Status: DISCONTINUED | OUTPATIENT
Start: 2022-07-02 | End: 2022-07-04 | Stop reason: HOSPADM

## 2022-07-02 RX ORDER — SODIUM CHLORIDE 9 MG/ML
INJECTION, SOLUTION INTRAVENOUS CONTINUOUS
Status: ACTIVE | OUTPATIENT
Start: 2022-07-02 | End: 2022-07-03

## 2022-07-02 RX ORDER — MAGNESIUM SULFATE HEPTAHYDRATE 40 MG/ML
2 INJECTION, SOLUTION INTRAVENOUS ONCE
Status: COMPLETED | OUTPATIENT
Start: 2022-07-02 | End: 2022-07-02

## 2022-07-02 RX ORDER — ONDANSETRON 2 MG/ML
4 INJECTION INTRAMUSCULAR; INTRAVENOUS EVERY 6 HOURS PRN
Status: DISCONTINUED | OUTPATIENT
Start: 2022-07-02 | End: 2022-07-04 | Stop reason: HOSPADM

## 2022-07-02 RX ORDER — ONDANSETRON 2 MG/ML
4 INJECTION INTRAMUSCULAR; INTRAVENOUS ONCE
Status: COMPLETED | OUTPATIENT
Start: 2022-07-02 | End: 2022-07-02

## 2022-07-02 RX ORDER — LOSARTAN POTASSIUM 100 MG/1
100 TABLET ORAL DAILY
COMMUNITY
End: 2022-07-04

## 2022-07-02 RX ORDER — HALOPERIDOL 5 MG/ML
2.5-5 INJECTION INTRAMUSCULAR EVERY 6 HOURS PRN
Status: DISCONTINUED | OUTPATIENT
Start: 2022-07-02 | End: 2022-07-04 | Stop reason: HOSPADM

## 2022-07-02 RX ORDER — OLANZAPINE 5 MG/1
5-10 TABLET, ORALLY DISINTEGRATING ORAL EVERY 6 HOURS PRN
Status: DISCONTINUED | OUTPATIENT
Start: 2022-07-02 | End: 2022-07-04 | Stop reason: HOSPADM

## 2022-07-02 RX ORDER — TRAZODONE HYDROCHLORIDE 100 MG/1
100-200 TABLET ORAL
COMMUNITY

## 2022-07-02 RX ORDER — CLONAZEPAM 1 MG/1
1 TABLET ORAL DAILY PRN
Status: DISCONTINUED | OUTPATIENT
Start: 2022-07-02 | End: 2022-07-04 | Stop reason: HOSPADM

## 2022-07-02 RX ORDER — QUETIAPINE FUMARATE 100 MG/1
100 TABLET, FILM COATED ORAL AT BEDTIME
COMMUNITY
End: 2022-11-05

## 2022-07-02 RX ORDER — POTASSIUM CHLORIDE 1.5 G/1.58G
20 POWDER, FOR SOLUTION ORAL ONCE
Status: COMPLETED | OUTPATIENT
Start: 2022-07-02 | End: 2022-07-02

## 2022-07-02 RX ORDER — FLUMAZENIL 0.1 MG/ML
0.2 INJECTION, SOLUTION INTRAVENOUS
Status: DISCONTINUED | OUTPATIENT
Start: 2022-07-02 | End: 2022-07-04 | Stop reason: HOSPADM

## 2022-07-02 RX ORDER — POTASSIUM CHLORIDE 1.5 G/1.58G
40 POWDER, FOR SOLUTION ORAL ONCE
Status: COMPLETED | OUTPATIENT
Start: 2022-07-02 | End: 2022-07-02

## 2022-07-02 RX ORDER — ONDANSETRON 4 MG/1
4 TABLET, ORALLY DISINTEGRATING ORAL EVERY 6 HOURS PRN
Status: DISCONTINUED | OUTPATIENT
Start: 2022-07-02 | End: 2022-07-04 | Stop reason: HOSPADM

## 2022-07-02 RX ORDER — FOLIC ACID 1 MG/1
1 TABLET ORAL DAILY
Status: DISCONTINUED | OUTPATIENT
Start: 2022-07-03 | End: 2022-07-04 | Stop reason: HOSPADM

## 2022-07-02 RX ORDER — LORAZEPAM 1 MG/1
1-2 TABLET ORAL EVERY 30 MIN PRN
Status: DISCONTINUED | OUTPATIENT
Start: 2022-07-02 | End: 2022-07-04 | Stop reason: HOSPADM

## 2022-07-02 RX ADMIN — SODIUM CHLORIDE: 9 INJECTION, SOLUTION INTRAVENOUS at 17:04

## 2022-07-02 RX ADMIN — POTASSIUM CHLORIDE 40 MEQ: 1.5 POWDER, FOR SOLUTION ORAL at 18:20

## 2022-07-02 RX ADMIN — SODIUM CHLORIDE, POTASSIUM CHLORIDE, SODIUM LACTATE AND CALCIUM CHLORIDE 1000 ML: 600; 310; 30; 20 INJECTION, SOLUTION INTRAVENOUS at 12:20

## 2022-07-02 RX ADMIN — QUETIAPINE FUMARATE 100 MG: 50 TABLET ORAL at 22:21

## 2022-07-02 RX ADMIN — MAGNESIUM SULFATE HEPTAHYDRATE 2 G: 40 INJECTION, SOLUTION INTRAVENOUS at 19:51

## 2022-07-02 RX ADMIN — FOLIC ACID: 5 INJECTION, SOLUTION INTRAMUSCULAR; INTRAVENOUS; SUBCUTANEOUS at 15:42

## 2022-07-02 RX ADMIN — HYDROMORPHONE HYDROCHLORIDE 0.3 MG: 1 INJECTION, SOLUTION INTRAMUSCULAR; INTRAVENOUS; SUBCUTANEOUS at 19:46

## 2022-07-02 RX ADMIN — DIAZEPAM 5 MG: 5 INJECTION, SOLUTION INTRAMUSCULAR; INTRAVENOUS at 13:20

## 2022-07-02 RX ADMIN — HYDROMORPHONE HYDROCHLORIDE 0.3 MG: 1 INJECTION, SOLUTION INTRAMUSCULAR; INTRAVENOUS; SUBCUTANEOUS at 22:43

## 2022-07-02 RX ADMIN — DICYCLOMINE HYDROCHLORIDE 20 MG: 10 CAPSULE ORAL at 13:19

## 2022-07-02 RX ADMIN — ONDANSETRON 4 MG: 2 INJECTION INTRAMUSCULAR; INTRAVENOUS at 12:20

## 2022-07-02 RX ADMIN — PROCHLORPERAZINE EDISYLATE 10 MG: 5 INJECTION INTRAMUSCULAR; INTRAVENOUS at 17:03

## 2022-07-02 RX ADMIN — POTASSIUM CHLORIDE 20 MEQ: 1.5 POWDER, FOR SOLUTION ORAL at 20:58

## 2022-07-02 ASSESSMENT — ACTIVITIES OF DAILY LIVING (ADL)
ADLS_ACUITY_SCORE: 22
ADLS_ACUITY_SCORE: 35
ADLS_ACUITY_SCORE: 22
ADLS_ACUITY_SCORE: 22
ADLS_ACUITY_SCORE: 35

## 2022-07-02 ASSESSMENT — ENCOUNTER SYMPTOMS
HEMATURIA: 0
DIFFICULTY URINATING: 0
DIARRHEA: 1
VOMITING: 1
NAUSEA: 1

## 2022-07-02 NOTE — ED NOTES
"[facility]  ED Nurse Handoff Report    Olman Condon is a 56 year old male   ED Chief complaint: Withdrawal and Nausea, Vomiting, & Diarrhea  . ED Diagnosis:   Final diagnoses:   Hypokalemia   Hypercalcemia   OSMAR (acute kidney injury) (H)   Alcohol withdrawal syndrome without complication (H)     Allergies:   Allergies   Allergen Reactions     Bee Venom      PN: throat swelling     Meperidine      Bupropion Rash       Code Status: Full Code  Activity level - Baseline/Home:  Independent.   Activity Level - Current:   Stand by Assist.   Lift room needed: No.   Bariatric: No   Needed: No   Isolation: No.   Infection: Not Applicable.     Vital Signs:   Vitals:    07/02/22 1121   BP: (!) 130/90   Pulse: 102   Resp: 20   Temp: 97.5  F (36.4  C)   TempSrc: Temporal   SpO2: 97%       Cardiac Rhythm:  ,      Pain level:    Patient confused: No.   Patient Falls Risk: No.   Elimination Status: Has voided   Patient Report - Initial Complaint: Withdrawl.   Focused Assessment: Olman Condon is a 56 year old male with history of Schizophrenia and hypertension who presents with ETOH withdrawal, nausea, vomiting, & diarrhea. Symptoms have been ongoing for 4 days. No difficulty urinating and he has not noticed blood in his urine. He quit drinking alcohol 2 days ago. He was drinking 5-6 [place] alcoholic beverages for \"months.\" He denies previously needing help for alcohol withdrawal. The patient has a past medical history of schizophrenia which he takes daily medication for.    Tests Performed:   Labs Ordered and Resulted from Time of ED Arrival to Time of ED Departure   COMPREHENSIVE METABOLIC PANEL - Abnormal       Result Value    Sodium 131 (*)     Potassium 3.0 (*)     Chloride 92 (*)     Carbon Dioxide (CO2) 30      Anion Gap 9      Urea Nitrogen 24      Creatinine 2.04 (*)     Calcium 12.6 (*)     Glucose 118 (*)     Alkaline Phosphatase 94      AST 91 (*)     ALT 99 (*)     Protein " Total 7.1      Albumin 3.3 (*)     Bilirubin Total 0.9      GFR Estimate 38 (*)    LIPASE - Abnormal    Lipase 830 (*)    CBC WITH PLATELETS AND DIFFERENTIAL - Abnormal    WBC Count 14.7 (*)     RBC Count 4.07 (*)     Hemoglobin 13.0 (*)     Hematocrit 37.3 (*)     MCV 92      MCH 31.9      MCHC 34.9      RDW 11.9      Platelet Count 362      % Neutrophils 88      % Lymphocytes 6      % Monocytes 5      % Eosinophils 0      % Basophils 0      % Immature Granulocytes 1      NRBCs per 100 WBC 0      Absolute Neutrophils 12.9 (*)     Absolute Lymphocytes 0.9      Absolute Monocytes 0.7      Absolute Eosinophils 0.0      Absolute Basophils 0.0      Absolute Immature Granulocytes 0.1      Absolute NRBCs 0.0     ETHYL ALCOHOL LEVEL - Normal    Alcohol ethyl <0.01     COVID-19 VIRUS (CORONAVIRUS) BY PCR   MAGNESIUM   PHOSPHORUS      Treatments provided: See Epic  Family Comments: GUTIERREZ  OBS brochure/video discussed/provided to patient:  N/A  ED Medications:   Medications   sodium chloride 0.9 % 1,000 mL with Infuvite Adult 10 mL, thiamine 100 mg, folic acid 1 mg, potassium chloride 20 mEq, magnesium sulfate 2 g infusion (has no administration in time range)   sodium chloride 0.9% infusion (has no administration in time range)   potassium chloride 10 mEq in 100 mL sterile water intermittent infusion (premix) (has no administration in time range)   potassium chloride 10 mEq in 100 mL sterile water intermittent infusion (premix) (has no administration in time range)   OLANZapine zydis (zyPREXA) ODT tab 5-10 mg (has no administration in time range)     Or   haloperidol lactate (HALDOL) injection 2.5-5 mg (has no administration in time range)   flumazenil (ROMAZICON) injection 0.2 mg (has no administration in time range)   melatonin tablet 5 mg (has no administration in time range)   LORazepam (ATIVAN) tablet 1-2 mg (has no administration in time range)     Or   LORazepam (ATIVAN) injection 1-2 mg (has no administration in time  range)   folic acid (FOLVITE) tablet 1 mg (has no administration in time range)   multivitamin w/minerals (THERA-VIT-M) tablet 1 tablet (has no administration in time range)   thiamine (B-1) tablet 100 mg (has no administration in time range)   lactated ringers BOLUS 1,000 mL (1,000 mLs Intravenous New Bag 7/2/22 1220)   ondansetron (ZOFRAN) injection 4 mg (4 mg Intravenous Given 7/2/22 1220)   dicyclomine (BENTYL) capsule 20 mg (20 mg Oral Given 7/2/22 1319)   diazepam (VALIUM) injection 5 mg (5 mg Intravenous Given 7/2/22 1320)     Drips infusing:  No  For the majority of the shift, the patient's behavior Green.   Interventions performed were NA.    Sepsis treatment initiated: No     Patient tested for COVID 19 prior to admission: YES    ED Nurse Name/Phone Number: Brittany Arevalo RN,   2:50 PM    RECEIVING UNIT ED HANDOFF REVIEW    Above ED Nurse Handoff Report was reviewed: Yes  Reviewed by: Violeta Concepcion RN on July 2, 2022 at 4:06 PM

## 2022-07-02 NOTE — PLAN OF CARE
Goal Outcome Evaluation:    Vitals: Temp: 98.3  F (36.8  C) Temp src: Oral BP: 122/82 Pulse: 96   Resp: 18 SpO2: 98 % O2 Device: None (Room air)    Pain: Reports abd pain  Neuro: CIWA 3, AO4, able to make needs known  Respiratory: LS clr, denies SOB  Cardiac/tele: WDL, denies CP  GI/: Diarrhea, stool sample cololected, results pending. Compazine x1 for nausea- effective  Skin: WDL  LDAs: PIV to left; IFV NS @ 150  Labs: K 3.0, Mag 1.8; replacement protocols initiated. Creat 2.04  Diet: Clr liq  Activity: SBA  Plan: Cont IVF, symptom management. Will continue POC.

## 2022-07-02 NOTE — ED TRIAGE NOTES
"Pt arrives with c/o 7 days of abdominal pain and N/V/D. Pt reports he hasn't been able to keep anything down. Pt reports he stopped drinking about 2 days ago and his symptoms worsened slightly. Pt endorses drinking 5-6 white claws a day \"for months\". Pt denies any AH/VH.      Triage Assessment     Row Name 07/02/22 1122       Triage Assessment (Adult)    Airway WDL WDL       Respiratory WDL    Respiratory WDL WDL       Skin Circulation/Temperature WDL    Skin Circulation/Temperature WDL WDL       Cardiac WDL    Cardiac WDL WDL       Peripheral/Neurovascular WDL    Peripheral Neurovascular WDL WDL       Cognitive/Neuro/Behavioral WDL    Cognitive/Neuro/Behavioral WDL WDL              "

## 2022-07-02 NOTE — H&P
[facility]  Hospitalist History & Physical     Assessment & Plan     ASSESSMENT:    56M with hx of alcoholism, HTN, and Schizophrenia presents with abdominal pain and n/v and found to have acute alcoholic pancreatitis.    PLAN:  -Alcoholic Pancreatitis: IVF. Pain control. CLD. If doesn't improve with treatment will obtain RUQ to eval for other etiologies.  -Acute Renal Failure: Suspect pre-renal in the setting of the above. IVF. Trend.  -Hyponatremia: Suspect hypovolemic hyponatremia in the setting of the above. IVF. Trend.  -Hypercalcemia: Etiology unclear. PTH. IVF. Trend.  -Alcoholism: Patient not interested in resources at this time, wants to quit on his own.  -Alcohol Withdrawal: CIWA.  -Schizophrenia: Home Seroquel and PRN Clonazepam.  -Essential HTN: Hold antihypertensives given OSMAR and metabolic derangements.  -Hypokalemia and Hypomagnesemia: Replete per protocol.  -DVT Prophy: Heparin.  -Disposition: 1-2 days to home.      John Gutiérrez MD    History of Present Illness     Olman Condon is a 56 year old with hx of alcoholism, HTN, and Schizophrenia presents with nausea and vomiting. Patient was in his normal state of health up until about one week ago when he started experiencing n/v, abdominal pain, and diarrhea. Pain is epigastric in location but radiates down to bilateral lower quadrants as well. Has been unable to keep much food or water down. Having 10+ watery BMs per day. Over the past three months patient has been drinking heavily after losing his job. Drinks 7+ drinks per day. In the ED, VSS. OSMAR and several electrolyte derangements. Lipase of 830. Patient admitted to medicine.    Review of Systems     A Comprehensive greater than 10 system review of systems was carried out.  Pertinent positives and negatives are noted above.  Otherwise negative for contributory information.     Past Medical History     Past Medical History:   Diagnosis Date     Alcohol abuse       Concussions, brain      Depressive disorder      Essential hypertension      Schizophrenia (H)      Medications     Current Outpatient Medications   Medication Sig Dispense Refill     chlorthalidone (HYGROTON) 25 MG tablet Take 25 mg by mouth daily       ClonazePAM (KLONOPIN PO) Take 1 mg by mouth daily       losartan (COZAAR) 100 MG tablet Take 100 mg by mouth daily       meloxicam (MOBIC) 15 MG tablet Take 15 mg by mouth daily as needed for pain       QUEtiapine (SEROQUEL) 100 MG tablet Take 100 mg by mouth At Bedtime       QUEtiapine (SEROQUEL) 25 MG tablet Take 25 mg by mouth 2 times daily as needed (agitation)       Sertraline HCl (ZOLOFT PO) Take 100 mg by mouth daily       traZODone (DESYREL) 100 MG tablet Take 100-200 mg by mouth nightly as needed for sleep       Past Surgical History     Past Surgical History:   Procedure Laterality Date     APPENDECTOMY       Family History   Denies family hx of alcoholism    Allergies     Allergies   Allergen Reactions     Bee Venom      PN: throat swelling     Meperidine      Bupropion Rash     Social History     Social History     Tobacco Use     Smoking status: Former Smoker     Smokeless tobacco: Current User   Substance Use Topics     Alcohol use: No     Comment: One beer today     Physical Exam   Blood pressure (!) 130/90, pulse 102, temperature 97.5  F (36.4  C), temperature source Temporal, resp. rate 20, SpO2 97 %.    General: Ill appearing, cooperative with exam, in NAD.  HEENT: Atraumatic. No erythema in posterior pharynx.  Lymph: No cervical or inguinal lymphadenopathy.  Cardiac: RRR. No murmurs.  Lungs: CTAB. Nl WOB.  Abd: Mild epigastric tenderness. No rebound or gaurding. Nl bowel sounds.  Ext: No edema. 2+ pulses.  Skin: No rashes, abrasions, or contusions.  Psych: A&Ox3. Nl affect.  Neuro: 5/5 strength. Sensation intact.    Labs & Imaging     Reviewed and Pertinent results discussed in assessment and plan.

## 2022-07-02 NOTE — ED PROVIDER NOTES
"  History   Chief Complaint:  Withdrawal and Nausea, Vomiting, & Diarrhea       The history is provided by the patient.      Olman Condon is a 56 year old male with history of Schizophrenia and hypertension who presents with ETOH withdrawal, nausea, vomiting, & diarrhea. Symptoms have been ongoing for 4 days. No difficulty urinating and he has not noticed blood in his urine. He quit drinking alcohol 2 days ago. He was drinking 5-6 [place] alcoholic beverages for \"months.\" He denies previously needing help for alcohol withdrawal. The patient has a past medical history of schizophrenia which he takes daily medication for.     Review of Systems   Gastrointestinal: Positive for diarrhea, nausea and vomiting.   Genitourinary: Negative for difficulty urinating and hematuria.   Psychiatric/Behavioral:        ETOH withdrawal   All other systems reviewed and are negative.      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  PCP: No Ref-Primary, Physician      Physical Exam     Patient Vitals for the past 24 hrs:   BP Temp Temp src Pulse Resp SpO2   07/02/22 1121 (!) 130/90 97.5  F (36.4  C) " Temporal 102 20 97 %       Physical Exam    Constitutional: Alert, attentive, GCS 15   HENT:    Mouth/Throat: Mild tongue fasciculations  Eyes: EOM are normal, anicteric, conjugate gaze  CV: distal extremities warm, well perfused  Chest: Non-labored breathing on RA  GI:  Generalized lower abdominal tenderness.  Neurological: Alert, attentive, moving all extremities equally.   Skin: Skin is warm and dry.    Emergency Department Course     Laboratory:  Labs Ordered and Resulted from Time of ED Arrival to Time of ED Departure   COMPREHENSIVE METABOLIC PANEL - Abnormal       Result Value    Sodium 131 (*)     Potassium 3.0 (*)     Chloride 92 (*)     Carbon Dioxide (CO2) 30      Anion Gap 9      Urea Nitrogen 24      Creatinine 2.04 (*)     Calcium 12.6 (*)     Glucose 118 (*)     Alkaline Phosphatase 94      AST 91 (*)     ALT 99 (*)     Protein Total 7.1      Albumin 3.3 (*)     Bilirubin Total 0.9      GFR Estimate 38 (*)    LIPASE - Abnormal    Lipase 830 (*)    CBC WITH PLATELETS AND DIFFERENTIAL - Abnormal    WBC Count 14.7 (*)     RBC Count 4.07 (*)     Hemoglobin 13.0 (*)     Hematocrit 37.3 (*)     MCV 92      MCH 31.9      MCHC 34.9      RDW 11.9      Platelet Count 362      % Neutrophils 88      % Lymphocytes 6      % Monocytes 5      % Eosinophils 0      % Basophils 0      % Immature Granulocytes 1      NRBCs per 100 WBC 0      Absolute Neutrophils 12.9 (*)     Absolute Lymphocytes 0.9      Absolute Monocytes 0.7      Absolute Eosinophils 0.0      Absolute Basophils 0.0      Absolute Immature Granulocytes 0.1      Absolute NRBCs 0.0     ETHYL ALCOHOL LEVEL - Normal    Alcohol ethyl <0.01     COVID-19 VIRUS (CORONAVIRUS) BY PCR   MAGNESIUM   PHOSPHORUS   PARATHYROID HORMONE INTACT   ENTERIC BACTERIA AND VIRUS PANEL BY NEGAR STOOL        Emergency Department Course:         Reviewed:  I reviewed nursing notes, vitals, past medical history and Care Everywhere    Assessments:  1206 I obtained history and examined  the patient as noted above.       Consults:  1411 I spoke with Dr. Padilla of the Hospitalist service from [facility] regarding patient's presentation, findings, and plan of care.      Interventions:  1220 Lactated ringers bolus 1 L, IV   1220 Zofran 4 mg IV   1319 Dicyclomine 20mg oral   1320 Diazepam 5mg IV     Disposition:  The patient was admitted to the hospital under the care of Dr. Padilla.     Impression & Plan   Medical Decision Makin-year-old male past medical history significant for schizophrenia, alcohol abuse presenting for 4 days of nausea nonbloody nonbilious vomitingand diarrhea without any melenic stools, he stopped drinking 2 days ago as he wanted to cut back.  His abdominal exam is benign with low suspicion for hollow viscus perforation, obstruction or infection.  He does have manifest symptoms of mild alcohol withdrawal as evidenced by hypotension, tachycardia and tongue fasciculations.  He was given IV fluids, IV Valium as well as Bentyl and Zofran with symptom improvement however his labs are notable for multiple metabolic derangements including an OSMAR with a creatinine of 2, hypercalcemia 12.6, hypokalemia at 3.0.  Lipase is elevated at 830, he does have a mild leukocytosis which I suspect is stress demargination with lower suspicion for sepsis or pancreatic necrosis given his benign exam.  Will admit to medicine for continued hydration and treatment of mild alcohol withdrawal, suspect vomiting diarrhea secondary to viral illness.  Low suspicion for hollow viscus perforation, obstruction or infection.    Diagnosis:    ICD-10-CM    1. Hypokalemia  E87.6    2. Hypercalcemia  E83.52    3. OSMAR (acute kidney injury) (H)  N17.9    4. Alcohol withdrawal syndrome without complication (H)  F10.230      Dillan Jerome MD  [employer]  3:46 PM 22       Scribe Disclosure:  SHEILA, Wilton Osorio, am serving as a scribe at 12:06 PM on 2022  to document services personally performed by Dillan Jerome MD based on my observations and the provider's statements to me.            Dillan Jerome MD  07/02/22 1711

## 2022-07-03 LAB
ALBUMIN SERPL-MCNC: 2.6 G/DL (ref 3.4–5)
ALP SERPL-CCNC: 77 U/L (ref 40–150)
ALT SERPL W P-5'-P-CCNC: 92 U/L (ref 0–70)
ANION GAP SERPL CALCULATED.3IONS-SCNC: 6 MMOL/L (ref 3–14)
AST SERPL W P-5'-P-CCNC: 90 U/L (ref 0–45)
BILIRUB SERPL-MCNC: 0.7 MG/DL (ref 0.2–1.3)
BUN SERPL-MCNC: 19 MG/DL (ref 7–30)
C COLI+JEJUNI+LARI FUSA STL QL NAA+PROBE: NOT DETECTED
CALCIUM SERPL-MCNC: 10.2 MG/DL (ref 8.5–10.1)
CHLORIDE BLD-SCNC: 100 MMOL/L (ref 94–109)
CO2 SERPL-SCNC: 30 MMOL/L (ref 20–32)
CREAT SERPL-MCNC: 1.71 MG/DL (ref 0.66–1.25)
EC STX1 GENE STL QL NAA+PROBE: NOT DETECTED
EC STX2 GENE STL QL NAA+PROBE: NOT DETECTED
ERYTHROCYTE [DISTWIDTH] IN BLOOD BY AUTOMATED COUNT: 12.5 % (ref 10–15)
GFR SERPL CREATININE-BSD FRML MDRD: 47 ML/MIN/1.73M2
GLUCOSE BLD-MCNC: 110 MG/DL (ref 70–99)
HCT VFR BLD AUTO: 32.6 % (ref 40–53)
HGB BLD-MCNC: 10.8 G/DL (ref 13.3–17.7)
MAGNESIUM SERPL-MCNC: 2.2 MG/DL (ref 1.6–2.3)
MCH RBC QN AUTO: 31.3 PG (ref 26.5–33)
MCHC RBC AUTO-ENTMCNC: 33.1 G/DL (ref 31.5–36.5)
MCV RBC AUTO: 95 FL (ref 78–100)
NOROV GI+II ORF1-ORF2 JNC STL QL NAA+PR: NOT DETECTED
PLATELET # BLD AUTO: 292 10E3/UL (ref 150–450)
POTASSIUM BLD-SCNC: 3.5 MMOL/L (ref 3.4–5.3)
POTASSIUM BLD-SCNC: 3.5 MMOL/L (ref 3.4–5.3)
PROT SERPL-MCNC: 6 G/DL (ref 6.8–8.8)
PTH-INTACT SERPL-MCNC: 5 PG/ML
RBC # BLD AUTO: 3.45 10E6/UL (ref 4.4–5.9)
RVA NSP5 STL QL NAA+PROBE: NOT DETECTED
SALMONELLA SP RPOD STL QL NAA+PROBE: NOT DETECTED
SHIGELLA SP+EIEC IPAH STL QL NAA+PROBE: NOT DETECTED
SODIUM SERPL-SCNC: 136 MMOL/L (ref 133–144)
V CHOL+PARA RFBL+TRKH+TNAA STL QL NAA+PR: NOT DETECTED
WBC # BLD AUTO: 12.5 10E3/UL (ref 4–11)
Y ENTERO RECN STL QL NAA+PROBE: NOT DETECTED

## 2022-07-03 PROCEDURE — 36415 COLL VENOUS BLD VENIPUNCTURE: CPT | Performed by: INTERNAL MEDICINE

## 2022-07-03 PROCEDURE — 120N000001 HC R&B MED SURG/OB

## 2022-07-03 PROCEDURE — 250N000013 HC RX MED GY IP 250 OP 250 PS 637: Performed by: INTERNAL MEDICINE

## 2022-07-03 PROCEDURE — 99233 SBSQ HOSP IP/OBS HIGH 50: CPT | Performed by: INTERNAL MEDICINE

## 2022-07-03 PROCEDURE — 84132 ASSAY OF SERUM POTASSIUM: CPT | Performed by: INTERNAL MEDICINE

## 2022-07-03 PROCEDURE — 258N000003 HC RX IP 258 OP 636: Performed by: INTERNAL MEDICINE

## 2022-07-03 PROCEDURE — 85027 COMPLETE CBC AUTOMATED: CPT | Performed by: INTERNAL MEDICINE

## 2022-07-03 PROCEDURE — 250N000011 HC RX IP 250 OP 636: Performed by: INTERNAL MEDICINE

## 2022-07-03 PROCEDURE — 83735 ASSAY OF MAGNESIUM: CPT | Performed by: INTERNAL MEDICINE

## 2022-07-03 RX ORDER — POTASSIUM CHLORIDE 1.5 G/1.58G
20 POWDER, FOR SOLUTION ORAL ONCE
Status: COMPLETED | OUTPATIENT
Start: 2022-07-03 | End: 2022-07-03

## 2022-07-03 RX ORDER — POTASSIUM CHLORIDE 1500 MG/1
20 TABLET, EXTENDED RELEASE ORAL ONCE
Status: COMPLETED | OUTPATIENT
Start: 2022-07-03 | End: 2022-07-03

## 2022-07-03 RX ADMIN — POTASSIUM CHLORIDE 20 MEQ: 1.5 POWDER, FOR SOLUTION ORAL at 09:06

## 2022-07-03 RX ADMIN — Medication 5 MG: at 00:59

## 2022-07-03 RX ADMIN — LORAZEPAM 1 MG: 1 TABLET ORAL at 04:09

## 2022-07-03 RX ADMIN — SODIUM CHLORIDE: 9 INJECTION, SOLUTION INTRAVENOUS at 08:13

## 2022-07-03 RX ADMIN — HYDROMORPHONE HYDROCHLORIDE 0.3 MG: 1 INJECTION, SOLUTION INTRAMUSCULAR; INTRAVENOUS; SUBCUTANEOUS at 22:43

## 2022-07-03 RX ADMIN — CLONAZEPAM 1 MG: 1 TABLET ORAL at 09:06

## 2022-07-03 RX ADMIN — HYDROMORPHONE HYDROCHLORIDE 0.3 MG: 1 INJECTION, SOLUTION INTRAMUSCULAR; INTRAVENOUS; SUBCUTANEOUS at 15:07

## 2022-07-03 RX ADMIN — SODIUM CHLORIDE: 9 INJECTION, SOLUTION INTRAVENOUS at 01:13

## 2022-07-03 RX ADMIN — MULTIPLE VITAMINS W/ MINERALS TAB 1 TABLET: TAB at 09:06

## 2022-07-03 RX ADMIN — HYDROMORPHONE HYDROCHLORIDE 0.3 MG: 1 INJECTION, SOLUTION INTRAMUSCULAR; INTRAVENOUS; SUBCUTANEOUS at 11:21

## 2022-07-03 RX ADMIN — LORAZEPAM 1 MG: 1 TABLET ORAL at 00:59

## 2022-07-03 RX ADMIN — ONDANSETRON 4 MG: 4 TABLET, ORALLY DISINTEGRATING ORAL at 17:10

## 2022-07-03 RX ADMIN — ONDANSETRON 4 MG: 4 TABLET, ORALLY DISINTEGRATING ORAL at 09:09

## 2022-07-03 RX ADMIN — HYDROMORPHONE HYDROCHLORIDE 0.3 MG: 1 INJECTION, SOLUTION INTRAMUSCULAR; INTRAVENOUS; SUBCUTANEOUS at 19:09

## 2022-07-03 RX ADMIN — QUETIAPINE FUMARATE 100 MG: 50 TABLET ORAL at 22:32

## 2022-07-03 RX ADMIN — FOLIC ACID 1 MG: 1 TABLET ORAL at 09:06

## 2022-07-03 RX ADMIN — SODIUM CHLORIDE: 9 INJECTION, SOLUTION INTRAVENOUS at 15:07

## 2022-07-03 RX ADMIN — LORAZEPAM 1 MG: 1 TABLET ORAL at 01:42

## 2022-07-03 RX ADMIN — THIAMINE HCL TAB 100 MG 100 MG: 100 TAB at 09:06

## 2022-07-03 RX ADMIN — POTASSIUM CHLORIDE 20 MEQ: 1500 TABLET, EXTENDED RELEASE ORAL at 01:42

## 2022-07-03 RX ADMIN — ONDANSETRON 4 MG: 4 TABLET, ORALLY DISINTEGRATING ORAL at 22:42

## 2022-07-03 ASSESSMENT — ACTIVITIES OF DAILY LIVING (ADL)
ADLS_ACUITY_SCORE: 20
ADLS_ACUITY_SCORE: 20
ADLS_ACUITY_SCORE: 22
ADLS_ACUITY_SCORE: 20

## 2022-07-03 NOTE — PLAN OF CARE
VSS on ra. A/O. Pt anxious, sweating and feeling slightly agitated. Ativan given per orders. Pt frustrated with pain and not being able to sleep. Says that Dilaudid doesn't work for him. Abd tender and soft. Bowel signs hyper. Tolerating clear liquids. Denies loose stools so far this shift. LPIV infusing. SBA with transfers. Potassium replaced.

## 2022-07-03 NOTE — PLAN OF CARE
Goal Outcome Evaluation:    Vitals: Temp: 98  F (36.7  C) Temp src: Oral BP: (!) 140/96 Pulse: 86   Resp: 18 SpO2: 97 % O2 Device: None (Room air)    Pain: 3 doses of IV dilaudid given today for abd pain  Neuro: AO4, able to make needs known. CIWA 4,2,4  Respiratory: LS clr, denies SOB  Cardiac/tele: WDL, denies CP  GI/: Pt reported nausea after meals today. Zofran given x2.   Skin: WDL  LDAs: PIV IVF NS @ 100  Labs: K 3.5, Mag 2.2; replacements followed per protocol  Diet: Diet was advanced from clr liq to regular today. Pt did not tolerate diet without increased abd pain and nausea.   Activity: SBA  Plan: Continue to manage pain and symptoms.

## 2022-07-03 NOTE — PROGRESS NOTES
[facility]  Hospitalist Progress Note     Assessment & Plan     ASSESSMENT:     56M with hx of alcoholism, HTN, and Schizophrenia presents with abdominal pain and n/v and found to have acute alcoholic pancreatitis. Improving but needs to progress diet further before considering discharge.     PLAN:  -Alcoholic Pancreatitis: IVF. Pain control. Advance diet as tolerated.  -Acute Renal Failure: Suspect pre-renal in the setting of the above. IVF. Trend.  -Hyponatremia: Suspect hypovolemic hyponatremia in the setting of the above. IVF. Trend.  -Hypercalcemia: Etiology unclear. PTH. IVF. Trend.  -Alcoholism: Patient not interested in resources at this time, wants to quit on his own.  -Alcohol Withdrawal: CIWA.  -Schizophrenia: Home Seroquel and PRN Clonazepam.  -Essential HTN: Hold antihypertensives given OSMAR and metabolic derangements.  -Hypokalemia and Hypomagnesemia: Replete per protocol.  -DVT Prophy: Heparin.  -Disposition: 1-2 days to home.        John Gutiérrez MD    Subjective     Patient seen at bedside in the morning and felt good although tried to advance diet and immediately had worsening abdominal pain and nausea.        Objective   Blood pressure 113/72, pulse 78, temperature 97.6  F (36.4  C), temperature source Oral, resp. rate 18, height 1.829 m (6'), weight 98.9 kg (218 lb), SpO2 96 %.    PHYSICAL EXAM  General: In no acute distress  CV: RRR.  Lungs: CTAB. Nl WOB.  Abd: Non-tender.  Ext: No edema.    LABS AND IMAGING: Reviewed and pertinent results discussed in assessment and plan.

## 2022-07-03 NOTE — PLAN OF CARE
Goal Outcome Evaluation:    Plan of Care Reviewed With: patient     Overall Patient Progress: no change    Blood pressure 122/82, pulse 96, temperature 98.3  F (36.8  C), temperature source Oral, resp. rate 18, height 1.829 m (6'), weight 98.9 kg (218 lb), SpO2 98 %.    Assumed care of pt at 1900.  Pt A&Ox4 on RA.  Complains of abdominal pain.  Received IV dilaudid.  Magnesium replaced. Recheck scheduled for Am.  Potassium recheck scheduled for 0100 on 07/03.  LS clear. CIWA score 3.  Enteric precautions to rule out Cdiff.  SBA.  Continue with POC.

## 2022-07-04 VITALS
RESPIRATION RATE: 16 BRPM | DIASTOLIC BLOOD PRESSURE: 102 MMHG | WEIGHT: 218 LBS | TEMPERATURE: 98.1 F | OXYGEN SATURATION: 95 % | BODY MASS INDEX: 29.53 KG/M2 | HEART RATE: 80 BPM | HEIGHT: 72 IN | SYSTOLIC BLOOD PRESSURE: 149 MMHG

## 2022-07-04 LAB
ANION GAP SERPL CALCULATED.3IONS-SCNC: 7 MMOL/L (ref 3–14)
BUN SERPL-MCNC: 14 MG/DL (ref 7–30)
CALCIUM SERPL-MCNC: 9.6 MG/DL (ref 8.5–10.1)
CHLORIDE BLD-SCNC: 100 MMOL/L (ref 94–109)
CO2 SERPL-SCNC: 30 MMOL/L (ref 20–32)
CREAT SERPL-MCNC: 1.56 MG/DL (ref 0.66–1.25)
GFR SERPL CREATININE-BSD FRML MDRD: 52 ML/MIN/1.73M2
GLUCOSE BLD-MCNC: 98 MG/DL (ref 70–99)
MAGNESIUM SERPL-MCNC: 1.5 MG/DL (ref 1.6–2.3)
POTASSIUM BLD-SCNC: 3.5 MMOL/L (ref 3.4–5.3)
SODIUM SERPL-SCNC: 137 MMOL/L (ref 133–144)

## 2022-07-04 PROCEDURE — 80048 BASIC METABOLIC PNL TOTAL CA: CPT | Performed by: INTERNAL MEDICINE

## 2022-07-04 PROCEDURE — 36415 COLL VENOUS BLD VENIPUNCTURE: CPT | Performed by: INTERNAL MEDICINE

## 2022-07-04 PROCEDURE — 250N000011 HC RX IP 250 OP 636: Performed by: INTERNAL MEDICINE

## 2022-07-04 PROCEDURE — 83735 ASSAY OF MAGNESIUM: CPT | Performed by: INTERNAL MEDICINE

## 2022-07-04 PROCEDURE — 99239 HOSP IP/OBS DSCHRG MGMT >30: CPT | Performed by: INTERNAL MEDICINE

## 2022-07-04 PROCEDURE — 250N000013 HC RX MED GY IP 250 OP 250 PS 637: Performed by: INTERNAL MEDICINE

## 2022-07-04 RX ORDER — MAGNESIUM SULFATE HEPTAHYDRATE 40 MG/ML
4 INJECTION, SOLUTION INTRAVENOUS ONCE
Status: COMPLETED | OUTPATIENT
Start: 2022-07-04 | End: 2022-07-04

## 2022-07-04 RX ORDER — POTASSIUM CHLORIDE 1500 MG/1
20 TABLET, EXTENDED RELEASE ORAL ONCE
Status: COMPLETED | OUTPATIENT
Start: 2022-07-04 | End: 2022-07-04

## 2022-07-04 RX ORDER — PROCHLORPERAZINE MALEATE 10 MG
10 TABLET ORAL EVERY 6 HOURS PRN
Qty: 15 TABLET | Refills: 0 | Status: SHIPPED | OUTPATIENT
Start: 2022-07-04 | End: 2023-06-19

## 2022-07-04 RX ORDER — OXYCODONE HYDROCHLORIDE 5 MG/1
5 TABLET ORAL EVERY 6 HOURS PRN
Qty: 5 TABLET | Refills: 0 | Status: SHIPPED | OUTPATIENT
Start: 2022-07-04 | End: 2022-07-04

## 2022-07-04 RX ORDER — OXYCODONE HYDROCHLORIDE 5 MG/1
5 TABLET ORAL EVERY 4 HOURS PRN
Status: DISCONTINUED | OUTPATIENT
Start: 2022-07-04 | End: 2022-07-04 | Stop reason: HOSPADM

## 2022-07-04 RX ORDER — OXYCODONE HYDROCHLORIDE 5 MG/1
5 TABLET ORAL EVERY 6 HOURS PRN
Qty: 10 TABLET | Refills: 0 | Status: SHIPPED | OUTPATIENT
Start: 2022-07-04 | End: 2023-06-19

## 2022-07-04 RX ADMIN — FOLIC ACID 1 MG: 1 TABLET ORAL at 08:09

## 2022-07-04 RX ADMIN — OXYCODONE HYDROCHLORIDE 5 MG: 5 TABLET ORAL at 10:55

## 2022-07-04 RX ADMIN — THIAMINE HCL TAB 100 MG 100 MG: 100 TAB at 08:09

## 2022-07-04 RX ADMIN — CLONAZEPAM 1 MG: 1 TABLET ORAL at 11:36

## 2022-07-04 RX ADMIN — POTASSIUM CHLORIDE 20 MEQ: 1500 TABLET, EXTENDED RELEASE ORAL at 08:09

## 2022-07-04 RX ADMIN — MAGNESIUM SULFATE HEPTAHYDRATE 4 G: 40 INJECTION, SOLUTION INTRAVENOUS at 08:26

## 2022-07-04 RX ADMIN — HYDROMORPHONE HYDROCHLORIDE 0.3 MG: 1 INJECTION, SOLUTION INTRAMUSCULAR; INTRAVENOUS; SUBCUTANEOUS at 08:26

## 2022-07-04 RX ADMIN — MULTIPLE VITAMINS W/ MINERALS TAB 1 TABLET: TAB at 08:09

## 2022-07-04 ASSESSMENT — ACTIVITIES OF DAILY LIVING (ADL)
ADLS_ACUITY_SCORE: 21
ADLS_ACUITY_SCORE: 20

## 2022-07-04 NOTE — PROGRESS NOTES
.Care Management Discharge Note    Discharge Date: 07/04/2022     Discharge Disposition: Home    Discharge Services:  n/a    Discharge DME:      Discharge Transportation:  Family or friend to provide    Handoff Referral Completed: No    Additional Information:  Reviewed pt's status and discussed in rounds. Pt has discharge orders to go home today, no needs. Pt identified as a Service Bundle #1 with a  PCP.     CATALINO King, [facility]  Inpatient Care Coordination  MS3, [facility]  [phone]    CATALINO Lazar

## 2022-07-04 NOTE — DISCHARGE SUMMARY
[facility]  Discharge Summary    Admit date:  7/2/2022    Discharge date and time: 07/04/22 0901    Discharge Physician: John Gutiérrez MD    Primary care provider: Celestine Dean    Primary Discharge Diagnosis      Alcoholic Pancreatitis    Secondary Diagnoses     Acute Renal Failure  Hyponatremia  Hypercalcemia  Alcoholism  Alcohol Withdrawal  Schizophrenia  Essential HTN  Hypokalemia and Hypomagnesemia    Summary of Hospital Stay     56M with hx of alcoholism, HTN, and Schizophrenia presented with abdominal pain and n/v and found to have acute alcoholic pancreatitis. Hospital course c/b acute renal failure. Both of these conditions improved with IVF and supportive care. Counseled on alcohol cessation. Home blood pressure medications on hold due to OSMAR - switched patient to nifedipine in the interim. Patient will f/u with his PCP and needs repeat BMP in 1 week from date of discharge.    Patient Discharge Condition & Exam     Discharge condition: Improved    /87 (BP Location: Left arm)   Pulse 77   Temp 98  F (36.7  C) (Oral)   Resp 16   Ht 1.829 m (6')   Wt 98.9 kg (218 lb)   SpO2 97%   BMI 29.57 kg/m       General: In NAD.  Cardiac: RRR.  Lungs: CTAB.  Abd: Non-tender.  Ext: No edema.    Discharge Instructions     Patient/family instructions: Written discharge instruction given to patient/family    Discharge Medications:     Review of your medicines      START taking      Dose / Directions   NIFEdipine ER 60 MG 24 hr tablet  Commonly known as: ADALAT CC  Used for: Essential hypertension      Dose: 60 mg  Take 1 tablet (60 mg) by mouth daily  Quantity: 30 tablet  Refills: 0     oxyCODONE 5 MG tablet  Commonly known as: ROXICODONE  Used for: Alcohol-induced acute pancreatitis, unspecified complication status      Dose: 5 mg  Take 1 tablet (5 mg) by mouth every 6 hours as needed for pain  Quantity: 5 tablet  Refills: 0     prochlorperazine 10 MG tablet  Commonly known as:  COMPAZINE  Used for: Alcohol-induced acute pancreatitis, unspecified complication status      Dose: 10 mg  Take 1 tablet (10 mg) by mouth every 6 hours as needed for nausea or vomiting  Quantity: 15 tablet  Refills: 0        CONTINUE these medicines which have NOT CHANGED      Dose / Directions   KLONOPIN PO      Dose: 1 mg  Take 1 mg by mouth daily  Refills: 0     * QUEtiapine 100 MG tablet  Commonly known as: SEROquel      Dose: 100 mg  Take 100 mg by mouth At Bedtime  Refills: 0     * QUEtiapine 25 MG tablet  Commonly known as: SEROquel      Dose: 25 mg  Take 25 mg by mouth 2 times daily as needed (agitation)  Refills: 0     traZODone 100 MG tablet  Commonly known as: DESYREL      Dose: 100-200 mg  Take 100-200 mg by mouth nightly as needed for sleep  Refills: 0     ZOLOFT PO      Dose: 100 mg  Take 100 mg by mouth daily  Refills: 0         * This list has 2 medication(s) that are the same as other medications prescribed for you. Read the directions carefully, and ask your doctor or other care provider to review them with you.            STOP taking    chlorthalidone 25 MG tablet  Commonly known as: HYGROTON        losartan 100 MG tablet  Commonly known as: COZAAR        meloxicam 15 MG tablet  Commonly known as: MOBIC              Where to get your medicines      These medications were sent to [id number] [address] [address]    Phone: [phone]     NIFEdipine ER 60 MG 24 hr tablet    prochlorperazine 10 MG tablet     Some of these will need a paper prescription and others can be bought over the counter. Ask your nurse if you have questions.    Bring a paper prescription for each of these medications    oxyCODONE 5 MG tablet        Discharge diet: Regular    Discharge activity: Activity as tolerated    Discharge follow-up:    Follow up with primary care provider within one week or earlier if symptoms return or gets worse.    Follow up with  consultant as instructed.    Pending Results     Unresulted Labs Ordered in the Past 30 Days of this Admission     No orders found from 6/2/2022 to 7/3/2022.           Patient Allergies     Allergies   Allergen Reactions     Bee Venom      PN: throat swelling     Meperidine      Bupropion Rash     Disposition   Disposition: Home     I saw and evaluated the patient on day of discharge and  discharge instructions reviewed  and  all the patient's questions and concerns addressed. Over 30 minutes spent on discharge and coordination of discharge process for this patient.

## 2022-07-04 NOTE — PLAN OF CARE
Goal Outcome Evaluation:    Patient alert and oriented. ABD pain managed with prn iv dilaudid. Zofran for nausea with relief. Patient reports anxiety, scheduled Seroquel at HS. CIWA protocol in place, no prn ativan per protocol this shift. Will continue to monitor per plan of care.      Plan of Care Reviewed With: patient

## 2022-07-05 ENCOUNTER — PATIENT OUTREACH (OUTPATIENT)
Dept: CARE COORDINATION | Facility: CLINIC | Age: 57
End: 2022-07-05

## 2022-07-05 DIAGNOSIS — Z71.89 OTHER SPECIFIED COUNSELING: ICD-10-CM

## 2022-07-05 NOTE — PROGRESS NOTES
Clinic Care Coordination Contact  [facility]/Voicemail    Clinical Data: Care Coordinator Outreach  Outreach attempted x 1. Left message on patient's voicemail with call back information and requested return call.     Plan:Care Coordinator will try to reach patient again in 1-2 business days.    PRASHANT Chance  [phone]  [facility]

## 2022-07-06 NOTE — PROGRESS NOTES
Clinic Care Coordination Contact  [facility]/Voicemail    Clinical Data: Care Coordinator Outreach  Outreach attempted x 2. Left message on patient's voicemail with call back information and requested return call.    Plan:Care Coordinator will do no further outreaches at this time.    PRASHANT Chance  [phone]  [facility]

## 2022-07-30 ENCOUNTER — HOSPITAL ENCOUNTER (EMERGENCY)
Facility: CLINIC | Age: 57
Discharge: HOME | End: 2022-07-30
Attending: EMERGENCY MEDICINE | Admitting: EMERGENCY MEDICINE
Payer: COMMERCIAL

## 2022-07-30 VITALS
RESPIRATION RATE: 16 BRPM | HEART RATE: 113 BPM | HEIGHT: 72 IN | SYSTOLIC BLOOD PRESSURE: 152 MMHG | DIASTOLIC BLOOD PRESSURE: 108 MMHG | BODY MASS INDEX: 29.12 KG/M2 | WEIGHT: 215 LBS | OXYGEN SATURATION: 98 % | TEMPERATURE: 97.8 F

## 2022-07-30 DIAGNOSIS — R74.01 ELEVATED LIVER TRANSAMINASE LEVEL: ICD-10-CM

## 2022-07-30 DIAGNOSIS — F13.930 BENZODIAZEPINE WITHDRAWAL WITHOUT COMPLICATION (H): ICD-10-CM

## 2022-07-30 LAB
ALBUMIN SERPL BCG-MCNC: 4 G/DL (ref 3.5–5.2)
ALP SERPL-CCNC: 112 U/L (ref 40–129)
ALT SERPL W P-5'-P-CCNC: 70 U/L (ref 10–50)
ANION GAP SERPL CALCULATED.3IONS-SCNC: 12 MMOL/L (ref 7–15)
AST SERPL W P-5'-P-CCNC: 54 U/L (ref 10–50)
BASOPHILS # BLD AUTO: 0 10E3/UL (ref 0–0.2)
BASOPHILS NFR BLD AUTO: 1 %
BILIRUB SERPL-MCNC: 0.3 MG/DL
BUN SERPL-MCNC: 17.3 MG/DL (ref 6–20)
CALCIUM SERPL-MCNC: 9.3 MG/DL (ref 8.6–10)
CHLORIDE SERPL-SCNC: 102 MMOL/L (ref 98–107)
CREAT SERPL-MCNC: 1.08 MG/DL (ref 0.67–1.17)
DEPRECATED HCO3 PLAS-SCNC: 23 MMOL/L (ref 22–29)
EOSINOPHIL # BLD AUTO: 0.2 10E3/UL (ref 0–0.7)
EOSINOPHIL NFR BLD AUTO: 2 %
ERYTHROCYTE [DISTWIDTH] IN BLOOD BY AUTOMATED COUNT: 13 % (ref 10–15)
ETHANOL SERPL-MCNC: <0.01 G/DL
GFR SERPL CREATININE-BSD FRML MDRD: 81 ML/MIN/1.73M2
GLUCOSE SERPL-MCNC: 134 MG/DL (ref 70–99)
HCT VFR BLD AUTO: 38.2 % (ref 40–53)
HGB BLD-MCNC: 12.7 G/DL (ref 13.3–17.7)
IMM GRANULOCYTES # BLD: 0.1 10E3/UL
IMM GRANULOCYTES NFR BLD: 1 %
LIPASE SERPL-CCNC: 29 U/L (ref 13–60)
LYMPHOCYTES # BLD AUTO: 1 10E3/UL (ref 0.8–5.3)
LYMPHOCYTES NFR BLD AUTO: 12 %
MAGNESIUM SERPL-MCNC: 1.8 MG/DL (ref 1.7–2.3)
MCH RBC QN AUTO: 31.3 PG (ref 26.5–33)
MCHC RBC AUTO-ENTMCNC: 33.2 G/DL (ref 31.5–36.5)
MCV RBC AUTO: 94 FL (ref 78–100)
MONOCYTES # BLD AUTO: 0.4 10E3/UL (ref 0–1.3)
MONOCYTES NFR BLD AUTO: 4 %
NEUTROPHILS # BLD AUTO: 6.9 10E3/UL (ref 1.6–8.3)
NEUTROPHILS NFR BLD AUTO: 80 %
NRBC # BLD AUTO: 0 10E3/UL
NRBC BLD AUTO-RTO: 0 /100
PLATELET # BLD AUTO: 354 10E3/UL (ref 150–450)
POTASSIUM SERPL-SCNC: 3.7 MMOL/L (ref 3.4–5.3)
PROT SERPL-MCNC: 6.8 G/DL (ref 6.4–8.3)
RBC # BLD AUTO: 4.06 10E6/UL (ref 4.4–5.9)
SODIUM SERPL-SCNC: 137 MMOL/L (ref 136–145)
TSH SERPL DL<=0.005 MIU/L-ACNC: 1.74 UIU/ML (ref 0.3–4.2)
WBC # BLD AUTO: 8.6 10E3/UL (ref 4–11)

## 2022-07-30 PROCEDURE — 83690 ASSAY OF LIPASE: CPT | Performed by: EMERGENCY MEDICINE

## 2022-07-30 PROCEDURE — 80053 COMPREHEN METABOLIC PANEL: CPT | Performed by: EMERGENCY MEDICINE

## 2022-07-30 PROCEDURE — 93005 ELECTROCARDIOGRAM TRACING: CPT

## 2022-07-30 PROCEDURE — 82077 ASSAY SPEC XCP UR&BREATH IA: CPT | Performed by: EMERGENCY MEDICINE

## 2022-07-30 PROCEDURE — 84443 ASSAY THYROID STIM HORMONE: CPT | Performed by: EMERGENCY MEDICINE

## 2022-07-30 PROCEDURE — 96361 HYDRATE IV INFUSION ADD-ON: CPT

## 2022-07-30 PROCEDURE — 258N000003 HC RX IP 258 OP 636: Performed by: EMERGENCY MEDICINE

## 2022-07-30 PROCEDURE — 36415 COLL VENOUS BLD VENIPUNCTURE: CPT | Performed by: EMERGENCY MEDICINE

## 2022-07-30 PROCEDURE — 83735 ASSAY OF MAGNESIUM: CPT | Performed by: EMERGENCY MEDICINE

## 2022-07-30 PROCEDURE — 99284 EMERGENCY DEPT VISIT MOD MDM: CPT | Mod: 25

## 2022-07-30 PROCEDURE — 96360 HYDRATION IV INFUSION INIT: CPT

## 2022-07-30 PROCEDURE — 85025 COMPLETE CBC W/AUTO DIFF WBC: CPT | Performed by: EMERGENCY MEDICINE

## 2022-07-30 RX ORDER — SODIUM CHLORIDE 9 MG/ML
INJECTION, SOLUTION INTRAVENOUS CONTINUOUS
Status: DISCONTINUED | OUTPATIENT
Start: 2022-07-30 | End: 2022-07-30 | Stop reason: HOSPADM

## 2022-07-30 RX ADMIN — SODIUM CHLORIDE 1000 ML: 9 INJECTION, SOLUTION INTRAVENOUS at 12:31

## 2022-07-30 ASSESSMENT — ENCOUNTER SYMPTOMS
SHORTNESS OF BREATH: 0
TREMORS: 1
NAUSEA: 1
NERVOUS/ANXIOUS: 1
VOMITING: 1
DIARRHEA: 1

## 2022-07-30 NOTE — ED PROVIDER NOTES
History   Chief Complaint:  Medication withdrawal      The history is provided by the patient.      Olman Condon is a 56 year old male with history of schizophrenia, anxiety, and withdrawal who presents with medication withdrawal symptoms. Olman reports tremors, involuntary extremity movements, anxiety, nausea, dry mouth, emesis x 1, and daily diarrhea. Denies chest pain and shortness of breath. Olman explains that his primary care provider retired without notice and that he has since been unable to refill his Klonopin prescription. He has visited a new primary care provider and psychiatrist who have both opted for Klonopin tapers of 0.5 MG for two weeks followed by 0.25 MG for an additional two weeks; his last dose of Klonopin was 0.25 MG four days ago. Olman is extremely dissatisfied with this Klonopin taper as he feels it is too aggressive and cannot function without 1 MG daily. Olman reports that Dr. Phillips will be prescribing him an additional Klonopin taper in eight days (08/07/2022).     Review of Systems   Respiratory: Negative for shortness of breath.    Cardiovascular: Negative for chest pain.   Gastrointestinal: Positive for diarrhea, nausea and vomiting.   Neurological: Positive for tremors.   Psychiatric/Behavioral: The patient is nervous/anxious.    All other systems reviewed and are negative.    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, left   HTN  Lumbar disc disease   Idiopathic insomnia   Recurrent cold sores   Major depressive disorder   Anxiety state   Extrapyramidal disease and abnormal movement disorder   ADD  Avascular necrosis of bone of left hip       Past Surgical History:    Appendectomy   Squamous cell cancer surgery x 3   Left outpatient direct anterior total hip arthroplasty     Family History:    Father- coronary occlusion   Mother- stroke     Social History:  The patient presents to the ED alone   Living Situation: patient reports he is being evicted from his home in two days (08/01/2022)  Alcohol Use: sober for last three weeks   Tobacco Use: smokeless, chew  Occupation: patient reports he begins a new job in nine days (08/08/2022)  PCP: None, recently retired PCP     Physical Exam     Patient Vitals for the past 24 hrs:   BP Temp Temp src Pulse Resp SpO2 Height Weight   07/30/22 1141 (!) 152/108 97.8  F (36.6  C) Temporal 113 16 98 % 1.829 m (6') 97.5 kg (215 lb)     Physical Exam  Constitutional: Well developed, nontox appearance  Head: Atraumatic.   Neck:  no stridor  Eyes: no scleral icterus  Cardiovascular:  regular tachycardia, 2+ bilat radial pulses  Pulmonary/Chest: nml resp effort, Clear BS bilat  Abdominal: ND, soft, NT, no rebound or guarding   Ext: Warm, well perfused, no edema  Neurological: A&O, symmetric facies, moves ext x4  Skin: Skin is warm and dry.   Psychiatric: Behavior is normal. Thought content normal.   Nursing note and vitals reviewed.    Emergency Department Course   ECG:  ECG taken at 1231, ECG read at 1252  Sinus tachycardia   Otherwise normal ECG  No change compared to EKG dated 01/19/2014   Rate 105 bpm. ME interval 142 ms. QRS duration 96 ms. QT/QTc 370/489 ms. P-R-T axes 61 75 72.    Laboratory:  Labs Ordered and Resulted from Time of ED Arrival to Time of ED Departure   COMPREHENSIVE METABOLIC PANEL - Abnormal       Result Value    Sodium 137      Potassium 3.7      Creatinine 1.08      Urea Nitrogen 17.3      Chloride 102      Carbon Dioxide (CO2) 23      Anion Gap 12      Glucose 134 (*)     Calcium 9.3      Protein Total 6.8      Albumin 4.0      Bilirubin Total 0.3      Alkaline Phosphatase 112      AST 54 (*)      ALT 70 (*)     GFR Estimate 81     CBC WITH PLATELETS AND DIFFERENTIAL - Abnormal    WBC Count 8.6      RBC Count 4.06 (*)     Hemoglobin 12.7 (*)     Hematocrit 38.2 (*)     MCV 94      MCH 31.3      MCHC 33.2      RDW 13.0      Platelet Count 354      % Neutrophils 80      % Lymphocytes 12      % Monocytes 4      % Eosinophils 2      % Basophils 1      % Immature Granulocytes 1      NRBCs per 100 WBC 0      Absolute Neutrophils 6.9      Absolute Lymphocytes 1.0      Absolute Monocytes 0.4      Absolute Eosinophils 0.2      Absolute Basophils 0.0      Absolute Immature Granulocytes 0.1      Absolute NRBCs 0.0     ETHYL ALCOHOL LEVEL - Abnormal    Alcohol ethyl <0.01 (*)    LIPASE - Normal    Lipase 29     MAGNESIUM - Normal    Magnesium 1.8     TSH WITH FREE T4 REFLEX - Normal    TSH 1.74       Emergency Department Course:       Reviewed:  I reviewed nursing notes, vitals, past medical history and Care Everywhere    Assessments:  1213 I obtained history and examined the patient as noted above.   1503 I rechecked the patient and explained findings. I discussed discharge with the patient. All questions answered.     Interventions:  1231 NS 1L IV     Disposition:  The patient was discharged to home.     Impression & Plan     Medical Decision Makin year old male presenting w/ concern for benzodiazepine withdrawal     DDx includes benzodiazepine withdrawal, electrolyte abnormality, dehydration, drug-seeking behavior, generalized anxiety disorder, alcohol abuse, alcohol withdrawal, alcohol dependence.  EKG interpretation as noted above.    Labs significant for nonspecific elevations of transaminases otherwise unremarkable.   Patient's presentation seems consistent with mild benzodiazepine withdrawal.  Given he has received and has prescriptions noted in the EMR for tapering prescriptions, I do not feel comfortable with writing the patient for a change in dosage for his outpatient prescription.  He was offered  transfer to detox or admission for benzodiazepine withdrawal management which she refused.  He is advised to follow-up with his primary care provider and/or psychiatry for reevaluation.  I explained to the patient that it would be inappropriate for the emergency department to prescribe chronic controlled medications on outpatient basis particularly when he has prescriptions noted from primary providers.  Given his refusal for admission or detox, at this time I feel the pt is safe for discharge.  Recommendations given regarding follow up with PCP and return to the emergency department as needed for new or worsening symptoms.  Patient counseled on all results, disposition and diagnosis.  They are understanding and agreeable to plan. Patient discharged in stable condition.         Diagnosis:    ICD-10-CM    1. Benzodiazepine withdrawal without complication (H)  F13.230    2. Elevated liver transaminase level  R74.01      Discharge Medications:  New Prescriptions    No medications on file     Scribe Disclosure:  SHEILA, Aleksandra Means, am serving as a scribe at 12:13 PM on 7/30/2022 to document services personally performed by Olman Gutierrez MD based on my observations and the provider's statements to me.      Olman Gutierrez MD  07/30/22 2602

## 2022-07-30 NOTE — ED NOTES
"During discharge patient cussing and making statements about the horriable care he received while he was here. Patient states \"why the fuck did I stay here for 3.5 hours if no one is going to fucking treat me\" Patient also states \"apparently care is not something that is provided at this hospital\"  "

## 2022-07-30 NOTE — DISCHARGE INSTRUCTIONS
Please follow-up with your primary care provider or another psychiatrist for reevaluation and further discussion of benzodiazepine withdrawal management in tapering prescriptions.  Please consider detox as an option to help you with your benzodiazepine withdrawal.    Please return to the emergency department as needed for new or worsening symptoms including vomiting unable to keep the thing down, seizures, fainting, any other concerning symptoms.

## 2022-07-30 NOTE — ED TRIAGE NOTES
"  Patient reports doctor retired that prescribed klonopin, found a different doctor and was told to taper off and was refused a refill. Patient reports having, \"shaking, involuntary arm jerking, diarrhea, abd pain\" patient concerned he is withdrawing.   Hx schizophrenia        Triage Assessment     Row Name 07/30/22 1142       Triage Assessment (Adult)    Airway WDL WDL              "

## 2022-08-01 LAB
ATRIAL RATE - MUSE: 105 BPM
DIASTOLIC BLOOD PRESSURE - MUSE: NORMAL MMHG
INTERPRETATION ECG - MUSE: NORMAL
P AXIS - MUSE: 61 DEGREES
PR INTERVAL - MUSE: 142 MS
QRS DURATION - MUSE: 96 MS
QT - MUSE: 370 MS
QTC - MUSE: 489 MS
R AXIS - MUSE: 75 DEGREES
SYSTOLIC BLOOD PRESSURE - MUSE: NORMAL MMHG
T AXIS - MUSE: 72 DEGREES
VENTRICULAR RATE- MUSE: 105 BPM

## 2022-10-14 ENCOUNTER — NURSE TRIAGE (OUTPATIENT)
Dept: NURSING | Facility: CLINIC | Age: 57
End: 2022-10-14

## 2022-10-14 NOTE — TELEPHONE ENCOUNTER
"  Nurse Triage SBAR    Is this a 2nd Level Triage? YES, LICENSED PRACTITIONER REVIEW IS REQUIRED    Situation: Severe back pain    Background: Patient states that he has a synovial cyst in the lumbar region of his spine. He states that he has an order from [facility] to have it drained in a couple of weeks but states that today the pain is unbearable. States that he is unable to bear weight. He says the pain runs down his leg into his toes and states he feels numbness \"through the whole thing\". Denies any bowel or bladder changes, denies abdominal pain.     Assessment: Severe back pain with leg and foot numbness    Protocol Recommended Disposition:   Go To ED/UCC Now (Or To Office With PCP Approval)    Recommendation:     Patient advised to go to the ED. He states that he is going to call [facility] first and then make a decision on what he is going to do.  Patients primary physician is through [facility].     NO     OLGA CORDERO RN      Does the patient meet one of the following criteria for ADS visit consideration? 16+ years old, no PCP (internal or external) but seen at [facility]     TIP  Providers, please consider if this condition is appropriate for management at one of our Acute and Diagnostic Services sites.     If patient is a good candidate, please use dotphrase <dot>triageresponse and select Refer to ADS to document.    Reason for Disposition    Weakness of a leg or foot (e.g., unable to bear weight, dragging foot)    Additional Information    Negative: Passed out (i.e., fainted, collapsed and was not responding)    Negative: Shock suspected (e.g., cold/pale/clammy skin, too weak to stand, low BP, rapid pulse)    Negative: Sounds like a life-threatening emergency to the triager    Negative: Major injury to the back (e.g., MVA, fall > 10 feet or 3 meters, penetrating injury, etc.)    Negative: Pain in the upper back over the ribs (rib cage) that radiates (travels) into the chest    Negative: Pain " in the upper back over the ribs (rib cage) and worsened by coughing (or clearly increases with breathing)    Negative: Back pain during pregnancy    Negative: SEVERE back pain of sudden onset and age > 60 years    Negative: SEVERE abdominal pain (e.g., excruciating)    Negative: Abdominal pain and age > 60 years    Negative: Unable to urinate (or only a few drops) and bladder feels very full    Negative: Loss of bladder or bowel control (urine or bowel incontinence; wetting self, leaking stool) of new-onset    Negative: Numbness (loss of sensation) in groin or rectal area    Negative: Pain radiates into groin, scrotum    Negative: Blood in urine (red, pink, or tea-colored)    Negative: Vomiting and pain over lower ribs of back (i.e., flank - kidney area)    Protocols used: BACK PAIN-A-[place]

## 2022-11-02 ENCOUNTER — HOSPITAL ENCOUNTER (EMERGENCY)
Facility: CLINIC | Age: 57
Discharge: HOME | End: 2022-11-02
Attending: EMERGENCY MEDICINE | Admitting: EMERGENCY MEDICINE
Payer: COMMERCIAL

## 2022-11-02 ENCOUNTER — APPOINTMENT (OUTPATIENT)
Dept: GENERAL RADIOLOGY | Facility: CLINIC | Age: 57
End: 2022-11-02
Attending: EMERGENCY MEDICINE
Payer: COMMERCIAL

## 2022-11-02 VITALS
SYSTOLIC BLOOD PRESSURE: 157 MMHG | DIASTOLIC BLOOD PRESSURE: 118 MMHG | RESPIRATION RATE: 22 BRPM | TEMPERATURE: 97.4 F | HEART RATE: 98 BPM | OXYGEN SATURATION: 97 %

## 2022-11-02 DIAGNOSIS — F41.0 ANXIETY ATTACK: ICD-10-CM

## 2022-11-02 DIAGNOSIS — S39.012A STRAIN OF LUMBAR REGION, INITIAL ENCOUNTER: ICD-10-CM

## 2022-11-02 LAB
ANION GAP SERPL CALCULATED.3IONS-SCNC: 13 MMOL/L (ref 7–15)
BASOPHILS # BLD AUTO: 0 10E3/UL (ref 0–0.2)
BASOPHILS NFR BLD AUTO: 0 %
BUN SERPL-MCNC: 17.3 MG/DL (ref 6–20)
CALCIUM SERPL-MCNC: 10.7 MG/DL (ref 8.6–10)
CHLORIDE SERPL-SCNC: 94 MMOL/L (ref 98–107)
CREAT SERPL-MCNC: 0.99 MG/DL (ref 0.67–1.17)
DEPRECATED HCO3 PLAS-SCNC: 26 MMOL/L (ref 22–29)
EOSINOPHIL # BLD AUTO: 0.1 10E3/UL (ref 0–0.7)
EOSINOPHIL NFR BLD AUTO: 0 %
ERYTHROCYTE [DISTWIDTH] IN BLOOD BY AUTOMATED COUNT: 12.2 % (ref 10–15)
GFR SERPL CREATININE-BSD FRML MDRD: 90 ML/MIN/1.73M2
GLUCOSE SERPL-MCNC: 114 MG/DL (ref 70–99)
HCT VFR BLD AUTO: 49.2 % (ref 40–53)
HGB BLD-MCNC: 16.7 G/DL (ref 13.3–17.7)
HOLD SPECIMEN: NORMAL
HOLD SPECIMEN: NORMAL
IMM GRANULOCYTES # BLD: 0.1 10E3/UL
IMM GRANULOCYTES NFR BLD: 1 %
LYMPHOCYTES # BLD AUTO: 1.6 10E3/UL (ref 0.8–5.3)
LYMPHOCYTES NFR BLD AUTO: 11 %
MCH RBC QN AUTO: 31 PG (ref 26.5–33)
MCHC RBC AUTO-ENTMCNC: 33.9 G/DL (ref 31.5–36.5)
MCV RBC AUTO: 91 FL (ref 78–100)
MONOCYTES # BLD AUTO: 0.8 10E3/UL (ref 0–1.3)
MONOCYTES NFR BLD AUTO: 5 %
NEUTROPHILS # BLD AUTO: 11.9 10E3/UL (ref 1.6–8.3)
NEUTROPHILS NFR BLD AUTO: 83 %
NRBC # BLD AUTO: 0 10E3/UL
NRBC BLD AUTO-RTO: 0 /100
PLATELET # BLD AUTO: 405 10E3/UL (ref 150–450)
POTASSIUM SERPL-SCNC: 3.2 MMOL/L (ref 3.4–5.3)
RBC # BLD AUTO: 5.39 10E6/UL (ref 4.4–5.9)
SODIUM SERPL-SCNC: 133 MMOL/L (ref 136–145)
TROPONIN T SERPL HS-MCNC: 15 NG/L
WBC # BLD AUTO: 14.4 10E3/UL (ref 4–11)

## 2022-11-02 PROCEDURE — 96374 THER/PROPH/DIAG INJ IV PUSH: CPT

## 2022-11-02 PROCEDURE — 85025 COMPLETE CBC W/AUTO DIFF WBC: CPT | Performed by: EMERGENCY MEDICINE

## 2022-11-02 PROCEDURE — 250N000013 HC RX MED GY IP 250 OP 250 PS 637: Performed by: EMERGENCY MEDICINE

## 2022-11-02 PROCEDURE — 84484 ASSAY OF TROPONIN QUANT: CPT | Performed by: EMERGENCY MEDICINE

## 2022-11-02 PROCEDURE — 80048 BASIC METABOLIC PNL TOTAL CA: CPT | Performed by: EMERGENCY MEDICINE

## 2022-11-02 PROCEDURE — 93005 ELECTROCARDIOGRAM TRACING: CPT

## 2022-11-02 PROCEDURE — 250N000011 HC RX IP 250 OP 636: Performed by: EMERGENCY MEDICINE

## 2022-11-02 PROCEDURE — 71046 X-RAY EXAM CHEST 2 VIEWS: CPT

## 2022-11-02 PROCEDURE — 36415 COLL VENOUS BLD VENIPUNCTURE: CPT | Performed by: EMERGENCY MEDICINE

## 2022-11-02 PROCEDURE — 99285 EMERGENCY DEPT VISIT HI MDM: CPT | Mod: 25

## 2022-11-02 RX ORDER — LIDOCAINE 4 G/G
1 PATCH TOPICAL ONCE
Status: DISCONTINUED | OUTPATIENT
Start: 2022-11-02 | End: 2022-11-03 | Stop reason: HOSPADM

## 2022-11-02 RX ORDER — DIAZEPAM 10 MG/2ML
5 INJECTION, SOLUTION INTRAMUSCULAR; INTRAVENOUS ONCE
Status: COMPLETED | OUTPATIENT
Start: 2022-11-02 | End: 2022-11-02

## 2022-11-02 RX ORDER — METHOCARBAMOL 500 MG/1
500 TABLET, FILM COATED ORAL 4 TIMES DAILY PRN
Qty: 20 TABLET | Refills: 0 | Status: SHIPPED | OUTPATIENT
Start: 2022-11-02 | End: 2023-06-19

## 2022-11-02 RX ADMIN — QUETIAPINE FUMARATE 25 MG: 25 TABLET ORAL at 20:39

## 2022-11-02 RX ADMIN — DIAZEPAM 5 MG: 10 INJECTION, SOLUTION INTRAMUSCULAR; INTRAVENOUS at 20:42

## 2022-11-02 RX ADMIN — LIDOCAINE 1 PATCH: 246 PATCH TOPICAL at 20:38

## 2022-11-02 ASSESSMENT — ACTIVITIES OF DAILY LIVING (ADL): ADLS_ACUITY_SCORE: 35

## 2022-11-02 ASSESSMENT — ENCOUNTER SYMPTOMS
NERVOUS/ANXIOUS: 1
SHORTNESS OF BREATH: 1
PALPITATIONS: 1
BACK PAIN: 1

## 2022-11-03 LAB
ATRIAL RATE - MUSE: 113 BPM
DIASTOLIC BLOOD PRESSURE - MUSE: NORMAL MMHG
INTERPRETATION ECG - MUSE: NORMAL
P AXIS - MUSE: 70 DEGREES
PR INTERVAL - MUSE: 150 MS
QRS DURATION - MUSE: 112 MS
QT - MUSE: 348 MS
QTC - MUSE: 477 MS
R AXIS - MUSE: 73 DEGREES
SYSTOLIC BLOOD PRESSURE - MUSE: NORMAL MMHG
T AXIS - MUSE: 59 DEGREES
VENTRICULAR RATE- MUSE: 113 BPM

## 2022-11-03 NOTE — DISCHARGE INSTRUCTIONS
Discharge Instructions  Back Pain  You were seen today for back pain. Back pain can have many causes, but most will get better without surgery or other specific treatment. Sometimes there is a herniated ( slipped ) disc. We don t usually do MRI scans to look for these right away, since most herniated discs will get better on their own with time.  Today, we did not find any evidence that your back pain was caused by a serious condition, such as an infection, fracture, or tumor. However, sometimes symptoms develop over time and cannot be found during an emergency visit, so it is very important that you follow up with your primary doctor.  Return to the Emergency Department if:  You develop a fever with your back pain.   You have weakness or change in sensation in one or both legs.  You lose control of your bowels or bladder, or can t empty your bladder.  Your pain gets much worse.     Follow-up with your doctor:  Unless your pain has completely gone away, please make an appointment with your doctor within one week.  You may need further management of your back pain, such as more pain medication, imaging such as an X-ray or MRI, or physical therapy.    What can I do to help myself?  Remain Active -- People are often afraid that they will hurt their back further or delay recovery by remaining active, but this is one of the best things you can do for your back. In fact, prolonged bed rest is not recommended. Studies have shown that people with low back pain recover faster when they remain active. Movement helps to bring blood flow to the muscles and relieve muscle spasms as well as preventing loss of muscle strength.  Heat -- Using a heating pad can help with low back pain during the first few weeks. Do not sleep with a heating pad, as you can be burned.   Pain medications - You may take a pain medication such as Tylenol  (acetaminophen), Advil , Nuprin  (ibuprofen) or Aleve  (naproxen).  If you have been given a  narcotic such as Vicodin  (hydrocodone with acetaminophen), Percocet  (oxycodone with acetaminophen), codeine, or a muscle relaxant such as Flexeril  (cyclobenzaprine) or Soma  (carisoprodol), do not drive for four hours after you have taken it. If the narcotic contains Tylenol  (acetaminophen), do not take Tylenol  with it. All narcotics will cause constipation, so eat a high fiber diet.   If you were given a prescription for medicine here today, be sure to read all of the information (including the package insert) that comes with your prescription.  This will include important information about the medicine, its side effects, and any warnings that you need to know about.  The pharmacist who fills the prescription can provide more information and answer questions you may have about the medicine.  If you have questions or concerns that the pharmacist cannot address, please call or return to the Emergency Department.   Opioid Medication Information    Pain medications are among the most commonly prescribed medicines, so we are including this information for all our patients. If you did not receive pain medication or get a prescription for pain medicine, you can ignore it.     You may have been given a prescription for an opioid (narcotic) pain medicine and/or have received a pain medicine while here in the Emergency Department. These medicines can make you drowsy or impaired. You must not drive, operate dangerous equipment, or engage in any other dangerous activities while taking these medications. If you drive while taking these medications, you could be arrested for DUI, or driving under the influence. Do not drink any alcohol while you are taking these medications.     Opioid pain medications can cause addiction. If you have a history of chemical dependency of any type, you are at a higher risk of becoming addicted to pain medications.  Only take these prescribed medications to treat your pain when all other  options have been tried. Take it for as short a time and as few doses as possible. Store your pain pills in a secure place, as they are frequently stolen and provide a dangerous opportunity for children or visitors in your house to start abusing these powerful medications. We will not replace any lost or stolen medicine.  As soon as your pain is better, you should flush all your remaining medication.     Many prescription pain medications contain Tylenol  (acetaminophen), including Vicodin , Tylenol #3 , Norco , Lortab , and Percocet .  You should not take any extra pills of Tylenol  if you are using these prescription medications or you can get very sick.  Do not ever take more than 3000 mg of acetaminophen in any 24 hour period.    All opioids tend to cause constipation. Drink plenty of water and eat foods that have a lot of fiber, such as fruits, vegetables, prune juice, apple juice and high fiber cereal.  Take a laxative if you don t move your bowels at least every other day. Miralax , Milk of Magnesia, Colace , or Senna  can be used to keep you regular.      Remember that you can always come back to the Emergency Department if you are not able to see your regular doctor in the amount of time listed above, if you get any new symptoms, or if there is anything that worries you.

## 2022-11-03 NOTE — ED NOTES
Rapid Assessment Note    History:   Olman Condon is a 56 year old male with history of hypertension and neurofibroma who endorses difficulty of breathing and 9/10 chronic back pain. Patient took an  4 mg tablet of Dilaudid and smoked marijuana earlier today due to poorly controlled recurrent low back pain. He reportedly began feeling the difficulty in breathing afterward. Patient used to take Quetiapine, but has run out and is unable to obtain a refill at this time. Patient is concerned he might be having a panic attack or heart attack. Patient reports he suffered a mechanical fall yesterday, but was able to brace himself with his arms and did not injure his back. No recent long travel or bloody cough.     Exam:   General:  Alert, interactive  Cardiovascular:  Well perfused; RRR, no murmur    No unilateral leg swelling  Lungs:  No respiratory distress, no accessory muscle use  Neuro:  Moving all 4 extremities  Skin:  Warm, dry  Psych:  Moderately anxious    Plan of Care:   I evaluated the patient and developed an initial plan of care. I discussed this plan and explained that I, or one of my partners, would be returning to complete the evaluation.     56-year-old male with recurrent low back pain presents to the ED with anxiety, chest pain and shortness of breath after taking Dilaudid and smoking marijuana to help control his low back pain.  EKG is nonischemic.  Basic laboratory studies including cardiac enzymes and chest x-ray ordered.  I ordered a single dose of quetiapine which he had previously taken to help control anxiety.    I, Nestor Grimaldo, am serving as a scribe to document services personally performed by Dr. Tejada, based on my observations and the provider's statements to me.    2018  [employer]    Portions of this medical record were completed by a scribe. UPON MY REVIEW AND AUTHENTICATION BY ELECTRONIC SIGNATURE, this confirms (a) I performed  the applicable clinical services, and (b) the record is accurate.        Vicente Tejada MD  11/02/22 2014

## 2022-11-03 NOTE — ED TRIAGE NOTES
Pt presents via EMS from home after calling 911 after c/o severe lower back pain. Pt took an  dilaudid, smoked marijuana, and experienced a panic attack. Pt prescribed quetiapine for panic attacks but recently ran out and was unable to obtain a refill. ABCs intact. AOx4.

## 2022-11-03 NOTE — ED PROVIDER NOTES
History   Chief Complaint:  Panic Attack    The history is provided by the patient.      Olman Condon is a 56 year old male with history of schizophrenia, anxiety, BPD who presents with anxiety. Olman explains that he was in pain earlier today (1200) so he took an old Dilaudid that he had left over from a previous surgery. He reports that he immediately began to feel heavy, but no pain relief. Olman states that he returned home and began to feel shortness of breath and extreme anxiety (1730). He explains that he usually takes Quetiapine for his anxiety but is unable to refill the prescription until he meets with his psychiatrist in approximately one month. He has not taken his Quetiapine in 2 days. Prior to taking Quetiapine, Olman was taking Klonopin but his psychiatrist will not fill that medication either. Olman reports that he smoked marijuana just prior to the onset of his panic attack. He describes shortness of breath, heart racing, anxiety, and immediate regret for taking the Dilaudid. Olman is still feeling anxious, but he is feeling safe in the emergency department compared to at home. He mentions heartburn and a fall last night. Denies recent travel.     Review of Systems   Respiratory: Positive for shortness of breath.    Cardiovascular: Positive for palpitations.   Musculoskeletal: Positive for back pain.   Psychiatric/Behavioral: The patient is nervous/anxious.    All other systems reviewed and are negative.    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   The patient reports daily marijuana use.   The patient does not drink daily.   PCP: Aisha Jurado PA-C    Physical Exam     Patient Vitals for the past 24 hrs:   BP Temp Temp src Pulse Resp SpO2   11/02/22 2156 -- -- -- -- -- 97 %   11/02/22 2113 (!) 157/118 -- -- 98 -- --   11/02/22 2031 -- -- -- -- -- 98 %   11/02/22 2030 (!) 176/108 -- -- 100 -- --   11/02/22 1946 -- 97.4  F (36.3  C) Temporal -- -- --   11/02/22 1936 (!) 173/107 98.8  F (37.1  C) Oral 104 22 100 %     Physical Exam  General: The patient is alert, in no respiratory distress.    HENT: Mucous membranes moist.    Cardiovascular: Regular rate and rhythm. Good pulses in all four extremities. Normal capillary refill and skin turgor.     Respiratory: Lungs are clear. No nasal flaring. No retractions. No wheezing, no crackles.    Gastrointestinal: Abdomen soft. No guarding, no rebound. No palpable hernias.     Musculoskeletal: No gross deformity. Chest wall tenderness.     Skin: No rashes or petechiae.     Neurologic: The patient is alert and oriented x3. GCS 15. No testable cranial nerve deficit. Follows commands with clear and appropriate speech. Gives appropriate answers. Good strength in all extremities. No gross neurologic deficit. Gross sensation intact. Pupils are round and reactive. No meningismus.     Lymphatic: No cervical adenopathy. No lower extremity swelling.    Psychiatric: The patient is non-tearful. Anxious.     Emergency Department Course   ECG:  ECG taken at 1941, ECG read at 2012  Sinus tachycardia with occasional  premature ventricular complexes   Possible Left atrial enlargement   Borderline ECG  Rate 113 bpm. AK interval 150 ms. QRS duration 112 ms. QT/QTc 348/477 ms. P-R-T axes 70 73 59.    Imaging:  Chest XR,  PA & LAT   Final Result   IMPRESSION: No acute cardiopulmonary findings.        Report per radiology    Laboratory:  Labs Ordered and Resulted from Time of ED Arrival to Time of ED Departure   BASIC METABOLIC PANEL - Abnormal       Result Value    Sodium 133 (*)     Potassium 3.2 (*)     Chloride 94 (*)     Carbon Dioxide (CO2) 26      Anion Gap 13      Urea Nitrogen 17.3      Creatinine 0.99      Calcium 10.7 (*)     Glucose 114 (*)     GFR Estimate 90     CBC WITH PLATELETS AND DIFFERENTIAL - Abnormal    WBC Count 14.4 (*)     RBC Count 5.39      Hemoglobin 16.7      Hematocrit 49.2      MCV 91      MCH 31.0      MCHC 33.9      RDW 12.2      Platelet Count 405      % Neutrophils 83      % Lymphocytes 11      % Monocytes 5      % Eosinophils 0      % Basophils 0      % Immature Granulocytes 1      NRBCs per 100 WBC 0      Absolute Neutrophils 11.9 (*)     Absolute Lymphocytes 1.6      Absolute Monocytes 0.8      Absolute Eosinophils 0.1      Absolute Basophils 0.0      Absolute Immature Granulocytes 0.1      Absolute NRBCs 0.0     TROPONIN T, HIGH SENSITIVITY - Normal    Troponin T, High Sensitivity 15       Emergency Department Course:       Reviewed:  I reviewed nursing notes, vitals, past medical history and Care Everywhere    Assessments:  2018 I obtained history and examined the patient as noted above.   2211 I rechecked the patient and explained findings. Patient decline meeting with DEC. I discussed discharge with the patient. All questions answered.     Interventions:  2038 Lidocaine 4% 1 Patch TD  2039 Quetiapine 25 MG PO  2042 Diazepam 5 MG IV    Disposition:  The patient was discharged to home.     Impression & Plan     Medical Decision Making:  The patient reports that he has rhinitis plan the pain is  benign we will refill it  Quite anxious he said that he had back pain and used an old Dilaudid tablet.  He said he was worried when he started to feel this way with anxious racing heart and thought it might be new due to that medicine.  He had used marijuana prior to this reported some shortness of breath but said he was quite anxious and worried about a panic attack.  I did consider arrhythmia ACS anemia electrolyte anomaly amongst other conditions.  Withdrawal was considered but the patient says he does not drink daily.  I did treat him with an Ativan for his anxiety his labs returned reassuring troponin was low and I think is chance of ACS is unlikely was going to talk to DEC however did not want to stay to wait and therefore will follow-up with her primary care doctor and resources and outpatient.    Diagnosis:    ICD-10-CM    1. Anxiety attack  F41.0       2. Strain of lumbar region, initial encounter  S39.012A         Discharge Medications:  Discharge Medication List as of 11/2/2022 10:21 PM      START taking these medications    Details   methocarbamol (ROBAXIN) 500 MG tablet Take 1 tablet (500 mg) by mouth 4 times daily as needed for muscle spasms, Disp-20 tablet, R-0, Local Print           Scribe Disclosure:  I, Aleksandra Means, am serving as a scribe at 8:18 PM on 11/2/2022 to document services personally performed by Olman Bowen MD based on my observations and the provider's statements to me.      Olman Bowen MD  11/02/22 2200

## 2022-11-05 ENCOUNTER — HOSPITAL ENCOUNTER (EMERGENCY)
Facility: CLINIC | Age: 57
Discharge: HOME | End: 2022-11-05
Attending: EMERGENCY MEDICINE | Admitting: EMERGENCY MEDICINE
Payer: COMMERCIAL

## 2022-11-05 VITALS
OXYGEN SATURATION: 95 % | TEMPERATURE: 97.8 F | SYSTOLIC BLOOD PRESSURE: 166 MMHG | HEART RATE: 81 BPM | DIASTOLIC BLOOD PRESSURE: 125 MMHG | RESPIRATION RATE: 20 BRPM

## 2022-11-05 DIAGNOSIS — F41.9 ANXIETY: ICD-10-CM

## 2022-11-05 DIAGNOSIS — I10 HYPERTENSION, UNSPECIFIED TYPE: ICD-10-CM

## 2022-11-05 LAB
ANION GAP SERPL CALCULATED.3IONS-SCNC: 11 MMOL/L (ref 7–15)
BUN SERPL-MCNC: 18.2 MG/DL (ref 6–20)
CALCIUM SERPL-MCNC: 9.3 MG/DL (ref 8.6–10)
CHLORIDE SERPL-SCNC: 101 MMOL/L (ref 98–107)
CREAT SERPL-MCNC: 0.97 MG/DL (ref 0.67–1.17)
DEPRECATED HCO3 PLAS-SCNC: 24 MMOL/L (ref 22–29)
ERYTHROCYTE [DISTWIDTH] IN BLOOD BY AUTOMATED COUNT: 12.9 % (ref 10–15)
GFR SERPL CREATININE-BSD FRML MDRD: >90 ML/MIN/1.73M2
GLUCOSE SERPL-MCNC: 136 MG/DL (ref 70–99)
HCT VFR BLD AUTO: 48.6 % (ref 40–53)
HGB BLD-MCNC: 16.3 G/DL (ref 13.3–17.7)
HOLD SPECIMEN: NORMAL
MCH RBC QN AUTO: 30.4 PG (ref 26.5–33)
MCHC RBC AUTO-ENTMCNC: 33.5 G/DL (ref 31.5–36.5)
MCV RBC AUTO: 91 FL (ref 78–100)
PLATELET # BLD AUTO: 388 10E3/UL (ref 150–450)
POTASSIUM SERPL-SCNC: 3.7 MMOL/L (ref 3.4–5.3)
RBC # BLD AUTO: 5.36 10E6/UL (ref 4.4–5.9)
SODIUM SERPL-SCNC: 136 MMOL/L (ref 136–145)
TROPONIN T SERPL HS-MCNC: 13 NG/L
WBC # BLD AUTO: 8.6 10E3/UL (ref 4–11)

## 2022-11-05 PROCEDURE — 36415 COLL VENOUS BLD VENIPUNCTURE: CPT | Performed by: EMERGENCY MEDICINE

## 2022-11-05 PROCEDURE — 82310 ASSAY OF CALCIUM: CPT | Performed by: EMERGENCY MEDICINE

## 2022-11-05 PROCEDURE — 250N000013 HC RX MED GY IP 250 OP 250 PS 637: Performed by: EMERGENCY MEDICINE

## 2022-11-05 PROCEDURE — 99284 EMERGENCY DEPT VISIT MOD MDM: CPT

## 2022-11-05 PROCEDURE — 85014 HEMATOCRIT: CPT | Performed by: EMERGENCY MEDICINE

## 2022-11-05 PROCEDURE — 93005 ELECTROCARDIOGRAM TRACING: CPT

## 2022-11-05 PROCEDURE — 84484 ASSAY OF TROPONIN QUANT: CPT | Performed by: EMERGENCY MEDICINE

## 2022-11-05 RX ORDER — CHLORTHALIDONE 25 MG/1
25 TABLET ORAL DAILY
Status: DISCONTINUED | OUTPATIENT
Start: 2022-11-05 | End: 2022-11-05

## 2022-11-05 RX ORDER — QUETIAPINE FUMARATE 25 MG/1
25 TABLET, FILM COATED ORAL 2 TIMES DAILY
Status: DISCONTINUED | OUTPATIENT
Start: 2022-11-05 | End: 2022-11-05

## 2022-11-05 RX ORDER — QUETIAPINE FUMARATE 25 MG/1
25 TABLET, FILM COATED ORAL 2 TIMES DAILY PRN
Qty: 20 TABLET | Refills: 0 | Status: SHIPPED | OUTPATIENT
Start: 2022-11-05

## 2022-11-05 RX ORDER — CHLORTHALIDONE 25 MG/1
25 TABLET ORAL ONCE
Status: COMPLETED | OUTPATIENT
Start: 2022-11-05 | End: 2022-11-05

## 2022-11-05 RX ORDER — QUETIAPINE FUMARATE 100 MG/1
100 TABLET, FILM COATED ORAL AT BEDTIME
Qty: 10 TABLET | Refills: 0 | Status: SHIPPED | OUTPATIENT
Start: 2022-11-05

## 2022-11-05 RX ORDER — QUETIAPINE FUMARATE 25 MG/1
25 TABLET, FILM COATED ORAL ONCE
Status: COMPLETED | OUTPATIENT
Start: 2022-11-05 | End: 2022-11-05

## 2022-11-05 RX ORDER — HYDROCHLOROTHIAZIDE 25 MG/1
25 TABLET ORAL ONCE
Status: COMPLETED | OUTPATIENT
Start: 2022-11-05 | End: 2022-11-05

## 2022-11-05 RX ADMIN — CHLORTHALIDONE 25 MG: 25 TABLET ORAL at 13:43

## 2022-11-05 RX ADMIN — QUETIAPINE FUMARATE 25 MG: 25 TABLET ORAL at 12:01

## 2022-11-05 RX ADMIN — HYDROCHLOROTHIAZIDE 25 MG: 25 TABLET ORAL at 12:01

## 2022-11-05 ASSESSMENT — ACTIVITIES OF DAILY LIVING (ADL)
ADLS_ACUITY_SCORE: 35
ADLS_ACUITY_SCORE: 33

## 2022-11-05 ASSESSMENT — ENCOUNTER SYMPTOMS
NERVOUS/ANXIOUS: 1
PALPITATIONS: 0

## 2022-11-05 NOTE — ED TRIAGE NOTES
Patient presents to the ED reporting uncontrolled anxiety and hypertension. States last night his BP was 186/130 and he had tingling in the face and bilateral arm numbness. States this morning symptoms have resolved, but BP continues to be high and is still anxious.

## 2022-11-05 NOTE — ED PROVIDER NOTES
History   Chief Complaint:  Hypertension and Anxiety       The history is provided by the patient.      Olman Condon is a 56 year old male with history of schizophrenia, anxiety, and bipolar disorder who presents with hypertension and anxiety. He was previously seen at this ED yesterday for anxiety, but declined meeting with DEC and was discharged home. Then last night, his blood pressure was 186/130. Also, he had tingling in his face and bilateral arm numbness around the same time. His symptoms resolved this morning, but he continues to have high blood pressure. He took a dose of Chlorthalidone which helped his blood pressure. He suspects that he is currently going through withdrawal from his Quetiapine since he ran out of it recently. He has an upcoming appointment with his psychiatrist, but is not until 9 days. Pertinent past family history includes his father who passed away at age 50 due to coronary occlusion. He denies worsening palpitations.     Review of Systems   Cardiovascular: Negative for palpitations.   Psychiatric/Behavioral: The patient is nervous/anxious.    All other systems reviewed and are negative.    Allergies:  Bee Venom  Meperidine  Bupropion  Gabapentin    Medications:  Clonazepam  Methocarbamol  Nifedipine  Roxicodone  Prochlorperazine  Quetiapine  Sertraline  Trazodone  Cyclobenzaprine  Diazepam  Losartan  Meloxicam  Prednisone  Pregabalin    Past Medical History:     Anxiety  ADHD  Avascular necrosis of head of femur  Bipolar disorder  Cannabis use disorder  Hypertension   Extrapyramidal disease  Insomnia  Depresion  Malignant neoplasm of skin, upper limb  OA  Schizo affective schizophrenia  Acute kidney injury  Alcohol withdrawal  Hypercalcemia  Hypokalemia     Past Surgical History:    Appendectomy    Anterior total hip arthroplasty  Finger surgery    Family History:    Coronary occlusion - father    Social History:  The patient presents to the ED with alone via private  vehicle  PCP: Aisha Jurado PA-C  Hx of alcohol use and marijuana use     Physical Exam     Patient Vitals for the past 24 hrs:   BP Temp Pulse Resp SpO2   11/05/22 1220 (!) 166/125 -- 81 -- 95 %   11/05/22 0947 (!) 167/126 97.8  F (36.6  C) 98 20 98 %       Physical Exam  Constitutional: Vital signs reviewed as above.   Head: No external signs of trauma noted.  Eyes: Pupils are equal, round, and reactive to light.   Neck: No JVD noted  Cardiovascular: normal rate, Regular rhythm and normal heart sounds.  No murmur heard. Equal B/L peripheral pulses.  Pulmonary/Chest: Effort normal and breath sounds normal. No respiratory distress. Patient has no wheezes. Patient has no rales.   Gastrointestinal: Soft. There is no tenderness.   Musculoskeletal/Extremities: No pitting edema noted. Normal tone.  Neurological: Patient is alert and oriented to person, place, and time.   Skin: Skin is warm and dry. There is no diaphoresis noted.   Psychiatric: The patient appears anxious.    Emergency Department Course   ECG:  ECG taken at 1143, ECG read at 1155  Sinus rhythm with premature atrial complexes   Otherwise normal ECG   When compared to 11/2/2022, sinus tachycardia resolved.   Rate 83 bpm. RI interval 138 ms. QRS duration 98 ms. QT/QTc 402/472 ms. P-R-T axes 35 66 62.     Laboratory:  Labs Ordered and Resulted from Time of ED Arrival to Time of ED Departure   BASIC METABOLIC PANEL - Abnormal       Result Value    Sodium 136      Potassium 3.7      Chloride 101      Carbon Dioxide (CO2) 24      Anion Gap 11      Urea Nitrogen 18.2      Creatinine 0.97      Calcium 9.3      Glucose 136 (*)     GFR Estimate >90     CBC WITH PLATELETS - Normal    WBC Count 8.6      RBC Count 5.36      Hemoglobin 16.3      Hematocrit 48.6      MCV 91      MCH 30.4      MCHC 33.5      RDW 12.9      Platelet Count 388     TROPONIN T, HIGH SENSITIVITY - Normal    Troponin T, High Sensitivity 13        Emergency Department Course:    Reviewed:  I  reviewed nursing notes, vitals, past medical history and Care Everywhere    Assessments/Consults:  ED Course as of 11/05/22 1559   Sat Nov 05, 2022   1117 I obtained history and examined the patient as noted above.   1233 I rechecked the patient and updated.   1308 Rechecked and explained findings. I discussed plan for discharge home.     Interventions:  1201 Seroquel 25 mg PO  1201 Hydrochlorothiazide 25 mg PO    Disposition:  The patient was discharged to home.     Impression & Plan   Medical Decision Making:  This 56-year-old male patient presents to the ED due to concerns for hypertension and anxiety.  The see the HPI and exam for specifics.  He was also evaluated at this facility on 11/2/2022 and due to some back discomfort, was given Robaxin.     The patient relates to me that he used to be on Klonopin which she felt was the only thing that controlled his schizophrenia but after his doctor retired and he had to change clinics, a Klonopin taper was started last summer and the patient has not had Klonopin in quite some time.  The patient states that he is also not had his Seroquel in the last 4 or 5 days.  He feels more anxious and has some hyperventilation last night.  He states that he took chlorthalidone last night to help his blood pressure and he noted a rapid improvement of his symptoms at that time.  He does not have this listed on his medication list nor do I see this in record review over the last 6 months in epic.  He states that he still has about 20 of these tablets at home.  He is scheduled to follow with his new psychiatrist on 11/14/2022.     At this time, I think the patient can be safely discharged.  I will refill a limited prescription of his Seroquel until he can be seen on 11/14.  I think that he should also contact his primary care clinic about his blood pressure management.  He states that he used to take losartan but was switched to nifedipine and has not had any dose titration since then.      He can return to the ED at anytime with new or worsening symptoms.    Diagnosis:    ICD-10-CM    1. Hypertension, unspecified type  I10       2. Anxiety  F41.9           Discharge Medications:  Discharge Medication List as of 11/5/2022  1:44 PM      CONTINUE these medications which have CHANGED    Details   !! QUEtiapine (SEROQUEL) 100 MG tablet Take 1 tablet (100 mg) by mouth At Bedtime, Disp-10 tablet, R-0, E-Prescribe      !! QUEtiapine (SEROQUEL) 25 MG tablet Take 1 tablet (25 mg) by mouth 2 times daily as needed (agitation), Disp-20 tablet, R-0, E-Prescribe       !! - Potential duplicate medications found. Please discuss with provider.        Scribe Disclosure:  I, Will Dietz, am serving as a scribe at 11:03 AM on 11/5/2022 to document services personally performed by Rusty Tejada DO  based on my observations and the provider's statements to me.            Rusty Tejada DO  11/05/22 1600

## 2022-11-05 NOTE — DISCHARGE INSTRUCTIONS
"What do you do next:   Continue your home medications unless we have specifically changed them  Since you noticed improvement taking your \"chlorthalidone\" at home, it may be reasonable to continue this medication given the elevation of your blood pressure.  I will represcribe your Seroquel to get you through until your psychiatry appointment on the 14th.  Follow up as indicated below    When do you return: If you have severe chest pain or shortness of breath, fainting, focal numbness/weakness of your face/arms/legs, trouble walking or speaking, or any other symptoms that concern you, please return to the ED for reevaluation.    Thank you for allowing us to care for you today.    "

## 2022-11-07 LAB
ATRIAL RATE - MUSE: 83 BPM
DIASTOLIC BLOOD PRESSURE - MUSE: NORMAL MMHG
INTERPRETATION ECG - MUSE: NORMAL
P AXIS - MUSE: 35 DEGREES
PR INTERVAL - MUSE: 138 MS
QRS DURATION - MUSE: 98 MS
QT - MUSE: 402 MS
QTC - MUSE: 472 MS
R AXIS - MUSE: 66 DEGREES
SYSTOLIC BLOOD PRESSURE - MUSE: NORMAL MMHG
T AXIS - MUSE: 62 DEGREES
VENTRICULAR RATE- MUSE: 83 BPM

## 2022-11-15 ENCOUNTER — HOSPITAL ENCOUNTER (EMERGENCY)
Facility: CLINIC | Age: 57
Discharge: LEFT BEFORE DISCHARGE | End: 2022-11-15
Payer: COMMERCIAL

## 2022-11-15 VITALS
BODY MASS INDEX: 29.8 KG/M2 | DIASTOLIC BLOOD PRESSURE: 94 MMHG | RESPIRATION RATE: 16 BRPM | TEMPERATURE: 97.7 F | SYSTOLIC BLOOD PRESSURE: 138 MMHG | HEIGHT: 72 IN | WEIGHT: 220 LBS | OXYGEN SATURATION: 98 % | HEART RATE: 68 BPM

## 2022-11-15 NOTE — ED TRIAGE NOTES
Pt scheduled for spinal surgery [facility] with MD Townsend on 11/29/22. C/O L4, L5, S1 pain, radiating down L leg. C/O numbness and nerve shooting sharp pain radiating down L buttock/hip and shooting down posterior LLE. Pt having difficulty ambulating and using a cane and w/c. Pt advised from [facility] to come to ED and possibly move up surgery per pt. Denies bowel and bladder control changes. ABC in tact. A/Ox4

## 2023-04-09 ENCOUNTER — APPOINTMENT (OUTPATIENT)
Dept: GENERAL RADIOLOGY | Facility: CLINIC | Age: 58
End: 2023-04-09
Attending: EMERGENCY MEDICINE
Payer: COMMERCIAL

## 2023-04-09 ENCOUNTER — HOSPITAL ENCOUNTER (EMERGENCY)
Facility: CLINIC | Age: 58
Discharge: HOME | End: 2023-04-09
Attending: EMERGENCY MEDICINE | Admitting: EMERGENCY MEDICINE
Payer: COMMERCIAL

## 2023-04-09 VITALS
HEART RATE: 84 BPM | TEMPERATURE: 98.2 F | RESPIRATION RATE: 20 BRPM | WEIGHT: 250 LBS | HEIGHT: 72 IN | DIASTOLIC BLOOD PRESSURE: 95 MMHG | SYSTOLIC BLOOD PRESSURE: 146 MMHG | BODY MASS INDEX: 33.86 KG/M2 | OXYGEN SATURATION: 99 %

## 2023-04-09 DIAGNOSIS — J45.21 EXACERBATION OF INTERMITTENT ASTHMA, UNSPECIFIED ASTHMA SEVERITY: ICD-10-CM

## 2023-04-09 LAB
ANION GAP SERPL CALCULATED.3IONS-SCNC: 12 MMOL/L (ref 7–15)
BASE EXCESS BLDV CALC-SCNC: -0.7 MMOL/L (ref -7.7–1.9)
BASOPHILS # BLD AUTO: 0 10E3/UL (ref 0–0.2)
BASOPHILS NFR BLD AUTO: 0 %
BUN SERPL-MCNC: 24.7 MG/DL (ref 6–20)
CALCIUM SERPL-MCNC: 9 MG/DL (ref 8.6–10)
CHLORIDE SERPL-SCNC: 103 MMOL/L (ref 98–107)
CREAT SERPL-MCNC: 0.91 MG/DL (ref 0.67–1.17)
DEPRECATED HCO3 PLAS-SCNC: 21 MMOL/L (ref 22–29)
EOSINOPHIL # BLD AUTO: 0 10E3/UL (ref 0–0.7)
EOSINOPHIL NFR BLD AUTO: 0 %
ERYTHROCYTE [DISTWIDTH] IN BLOOD BY AUTOMATED COUNT: 13 % (ref 10–15)
FLUAV RNA SPEC QL NAA+PROBE: NEGATIVE
FLUBV RNA RESP QL NAA+PROBE: NEGATIVE
GFR SERPL CREATININE-BSD FRML MDRD: >90 ML/MIN/1.73M2
GLUCOSE SERPL-MCNC: 112 MG/DL (ref 70–99)
HCO3 BLDV-SCNC: 23 MMOL/L (ref 21–28)
HCT VFR BLD AUTO: 45.5 % (ref 40–53)
HGB BLD-MCNC: 15.5 G/DL (ref 13.3–17.7)
IMM GRANULOCYTES # BLD: 0.1 10E3/UL
IMM GRANULOCYTES NFR BLD: 1 %
LYMPHOCYTES # BLD AUTO: 1.2 10E3/UL (ref 0.8–5.3)
LYMPHOCYTES NFR BLD AUTO: 11 %
MCH RBC QN AUTO: 30.6 PG (ref 26.5–33)
MCHC RBC AUTO-ENTMCNC: 34.1 G/DL (ref 31.5–36.5)
MCV RBC AUTO: 90 FL (ref 78–100)
MONOCYTES # BLD AUTO: 0.6 10E3/UL (ref 0–1.3)
MONOCYTES NFR BLD AUTO: 5 %
NEUTROPHILS # BLD AUTO: 9.6 10E3/UL (ref 1.6–8.3)
NEUTROPHILS NFR BLD AUTO: 83 %
NRBC # BLD AUTO: 0 10E3/UL
NRBC BLD AUTO-RTO: 0 /100
O2/TOTAL GAS SETTING VFR VENT: 0 %
PCO2 BLDV: 35 MM HG (ref 40–50)
PH BLDV: 7.43 [PH] (ref 7.32–7.43)
PLATELET # BLD AUTO: 323 10E3/UL (ref 150–450)
PO2 BLDV: 84 MM HG (ref 25–47)
POTASSIUM SERPL-SCNC: 4.1 MMOL/L (ref 3.4–5.3)
RBC # BLD AUTO: 5.06 10E6/UL (ref 4.4–5.9)
RSV RNA SPEC NAA+PROBE: NEGATIVE
SARS-COV-2 RNA RESP QL NAA+PROBE: NEGATIVE
SODIUM SERPL-SCNC: 136 MMOL/L (ref 136–145)
TROPONIN T SERPL HS-MCNC: 12 NG/L
WBC # BLD AUTO: 11.6 10E3/UL (ref 4–11)

## 2023-04-09 PROCEDURE — 82310 ASSAY OF CALCIUM: CPT | Performed by: EMERGENCY MEDICINE

## 2023-04-09 PROCEDURE — 250N000009 HC RX 250: Performed by: EMERGENCY MEDICINE

## 2023-04-09 PROCEDURE — 94640 AIRWAY INHALATION TREATMENT: CPT

## 2023-04-09 PROCEDURE — 85025 COMPLETE CBC W/AUTO DIFF WBC: CPT | Performed by: EMERGENCY MEDICINE

## 2023-04-09 PROCEDURE — 82803 BLOOD GASES ANY COMBINATION: CPT | Performed by: EMERGENCY MEDICINE

## 2023-04-09 PROCEDURE — 99285 EMERGENCY DEPT VISIT HI MDM: CPT | Mod: 25,CS

## 2023-04-09 PROCEDURE — C9803 HOPD COVID-19 SPEC COLLECT: HCPCS

## 2023-04-09 PROCEDURE — 93005 ELECTROCARDIOGRAM TRACING: CPT

## 2023-04-09 PROCEDURE — 87637 SARSCOV2&INF A&B&RSV AMP PRB: CPT | Performed by: EMERGENCY MEDICINE

## 2023-04-09 PROCEDURE — 71046 X-RAY EXAM CHEST 2 VIEWS: CPT

## 2023-04-09 PROCEDURE — 84484 ASSAY OF TROPONIN QUANT: CPT | Performed by: EMERGENCY MEDICINE

## 2023-04-09 PROCEDURE — 250N000012 HC RX MED GY IP 250 OP 636 PS 637: Performed by: EMERGENCY MEDICINE

## 2023-04-09 PROCEDURE — 36415 COLL VENOUS BLD VENIPUNCTURE: CPT | Performed by: EMERGENCY MEDICINE

## 2023-04-09 RX ORDER — PREDNISONE 20 MG/1
40 TABLET ORAL ONCE
Status: COMPLETED | OUTPATIENT
Start: 2023-04-09 | End: 2023-04-09

## 2023-04-09 RX ORDER — IPRATROPIUM BROMIDE AND ALBUTEROL SULFATE 2.5; .5 MG/3ML; MG/3ML
3 SOLUTION RESPIRATORY (INHALATION) ONCE
Status: COMPLETED | OUTPATIENT
Start: 2023-04-09 | End: 2023-04-09

## 2023-04-09 RX ADMIN — IPRATROPIUM BROMIDE AND ALBUTEROL SULFATE 3 ML: .5; 2.5 SOLUTION RESPIRATORY (INHALATION) at 11:30

## 2023-04-09 RX ADMIN — IPRATROPIUM BROMIDE AND ALBUTEROL SULFATE 3 ML: .5; 2.5 SOLUTION RESPIRATORY (INHALATION) at 11:11

## 2023-04-09 RX ADMIN — PREDNISONE 40 MG: 20 TABLET ORAL at 10:52

## 2023-04-09 ASSESSMENT — ENCOUNTER SYMPTOMS
SHORTNESS OF BREATH: 1
VOMITING: 0
CHEST TIGHTNESS: 1
DIARRHEA: 0
FEVER: 1
WHEEZING: 1
COUGH: 1

## 2023-04-09 ASSESSMENT — ACTIVITIES OF DAILY LIVING (ADL): ADLS_ACUITY_SCORE: 35

## 2023-04-09 NOTE — ED NOTES
"Pt stating \"I do not want to wait anymore. I need to go  my mom. I can't be here anymore.\" Pt agreed to go back to room to wait for doctor for further instructions. MD aware.   "

## 2023-04-09 NOTE — ED PROVIDER NOTES
History     Chief Complaint:  Shortness of Breath       The history is provided by the patient.      Olman Condon is a 57 year old male with a history of asthma and hypertension who presents with shortness of breath.  Yesterday he started to feel unwell with cold-like symptoms and subjective fever for which he took DayQuil and stayed home.  He felt he was wheezing but did not have an inhaler as he has not had any issues in several years.  He attributes some of his issues due to weight gain.  This morning he woke up and was more short of breath with wheezing and chest tightness which prompted his visit.  He denies lower extremity edema, vomiting, or diarrhea.  He had back surgery 5 months ago.    Independent Historian: As above    Review of External Notes: Preoperative family medicine visit November 2022.    ROS:  Review of Systems   Constitutional: Positive for fever (subjective).   Respiratory: Positive for cough, chest tightness, shortness of breath and wheezing.    Cardiovascular: Negative for chest pain and leg swelling.   Gastrointestinal: Negative for diarrhea and vomiting.   All other systems reviewed and are negative.    Allergies:  Bee Venom  Meperidine  Bupropion  Gabapentin  Sulfa Drugs     Medications:    ClonazePAM (KLONOPIN PO)  methocarbamol (ROBAXIN) 500 MG tablet  NIFEdipine ER (ADALAT CC) 60 MG 24 hr tablet  prochlorperazine (COMPAZINE) 10 MG tablet  QUEtiapine (SEROQUEL)   Sertraline HCl (ZOLOFT PO)  traZODone (DESYREL) 100 MG tablet    Past Medical History:    Past Medical History:   Diagnosis Date     Alcohol abuse      Concussions, brain      Depressive disorder      Essential hypertension      Schizophrenia (H)      Past Surgical History:    Past Surgical History:   Procedure Laterality Date     APPENDECTOMY        Social History:  Lives alone with his dog.  Smokes cigars.  Presents alone.    Physical Exam     Patient Vitals for the past 24 hrs:   BP Temp Temp src Pulse Resp SpO2  Height Weight   04/09/23 1210 (!) 146/95 -- -- 84 -- 99 % -- --   04/09/23 1120 (!) 142/96 -- -- 98 -- 99 % -- --   04/09/23 1118 -- -- -- -- -- 100 % -- --   04/09/23 0831 (!) 164/126 98.2  F (36.8  C) Temporal 92 20 97 % 1.829 m (6') 113.4 kg (250 lb)      Physical Exam  General: Well-developed and well-nourished. Well appearing middle-aged  man. Cooperative.  Head:  Atraumatic.  Eyes:  Conjunctivae, lids, and sclerae are normal.  Neck:  Supple. Normal range of motion.  CV:  Regular rate and rhythm. Normal heart sounds with no murmurs, rubs, or gallops detected.  Resp:  No respiratory distress.  Rare expiratory wheeze over the right hemithorax.  No decreased breath sounds, rales, or rhonchi.  GI:  Soft. Non-distended. Non-tender.    MS:  Normal ROM. No bilateral lower extremity edema.  Skin:  Warm. Non-diaphoretic. No pallor.  Neuro:  Awake. A&Ox3. Normal strength.  Psych: Normal mood and affect. Normal speech.  Vitals reviewed.    Emergency Department Course   EKG  Indication: Dyspnea  Time: 1055  Rate 80 bpm. TX interval 134. QRS duration 96. QT/QTc 392/452.   Normal sinus rhythm  Rightward axis  Borderline ECG  No acute ST changes.  No change as compared to prior, dated 11/5/22.    Imaging:  XR Chest 2 Views   Final Result   IMPRESSION: Negative chest.        Laboratory:  Labs Ordered and Resulted from Time of ED Arrival to Time of ED Departure   BASIC METABOLIC PANEL - Abnormal       Result Value    Sodium 136      Potassium 4.1      Chloride 103      Carbon Dioxide (CO2) 21 (*)     Anion Gap 12      Urea Nitrogen 24.7 (*)     Creatinine 0.91      Calcium 9.0      Glucose 112 (*)     GFR Estimate >90     BLOOD GAS VENOUS - Abnormal    pH Venous 7.43      pCO2 Venous 35 (*)     pO2 Venous 84 (*)     Bicarbonate Venous 23      Base Excess/Deficit (+/-) -0.7      FIO2 0     CBC WITH PLATELETS AND DIFFERENTIAL - Abnormal    WBC Count 11.6 (*)     RBC Count 5.06      Hemoglobin 15.5      Hematocrit 45.5       MCV 90      MCH 30.6      MCHC 34.1      RDW 13.0      Platelet Count 323      % Neutrophils 83      % Lymphocytes 11      % Monocytes 5      % Eosinophils 0      % Basophils 0      % Immature Granulocytes 1      NRBCs per 100 WBC 0      Absolute Neutrophils 9.6 (*)     Absolute Lymphocytes 1.2      Absolute Monocytes 0.6      Absolute Eosinophils 0.0      Absolute Basophils 0.0      Absolute Immature Granulocytes 0.1      Absolute NRBCs 0.0     INFLUENZA A/B, RSV, & SARS-COV2 PCR - Normal    Influenza A PCR Negative      Influenza B PCR Negative      RSV PCR Negative      SARS CoV2 PCR Negative     TROPONIN T, HIGH SENSITIVITY - Normal    Troponin T, High Sensitivity 12          Emergency Department Course & Assessments:  Interventions:  Medications   ipratropium - albuterol 0.5 mg/2.5 mg/3 mL (DUONEB) neb solution 3 mL (3 mLs Nebulization $Given 4/9/23 1111)   predniSONE (DELTASONE) tablet 40 mg (40 mg Oral $Given 4/9/23 1052)   ipratropium - albuterol 0.5 mg/2.5 mg/3 mL (DUONEB) neb solution 3 mL (3 mLs Nebulization $Given 4/9/23 1130)      Independent Interpretation (X-rays, CTs, rhythm strip):  I interpreted the chest x-ray.  No pneumothorax.  No lobar pneumonia.    Consultations/Discussion of Management or Tests:  Not applicable    Social Determinants of Health affecting care:  Lives alone  Noncompliant with care including unable to even wait for discharge instructions in the emergency department today  Assessments:  1240: While I was attending to a critical child, this patient eloped from the emergency department. Results were not reviewed. No discharge instructions were reviewed.      Disposition:  Eloped.    WELLS PE SCORE for Pulmonary Embolism likelihood (calculator)  Background  Calculates likelihood of PE based on 7 criteria including suspected DVT, HR>100, recent surgery or immobilization, prior VTE, hemoptysis, active cancer.  Data  has Bilateral low back pain with left-sided sciatica;  Hypercalcemia; Hypokalemia; OSMAR (acute kidney injury) (H); and Alcohol withdrawal syndrome without complication (H) on their problem list.   has a past surgical history that includes appendectomy.  Pulse: 92  Criteria   Of possible 12.5 points for 7 possible items:  NEGATIVE  Interpretation  Wells PE Score: 0  Score 0-2 points: Low PE probability (3.6% risk)  Impression & Plan    Medical Decision Making:  Olman is a 57-year-old man with hypertension and asthma who started feeling unwell just yesterday with cold symptoms and subjective fever.  He felt he was wheezing but did not have an inhaler as he has had not had issues with his asthma in several years.  This morning he was more short of breath and his chest felt tight which prompted his visit.  He appears well and is in no respiratory distress.  He is not hypoxic.  He does have rare expiratory wheezing, particularly on the right, for which he was given a DuoNeb.  He had some improvement but then felt much better after a second DuoNeb and prednisone    Investigation into alternate causes for shortness of breath was undertaken including EKG which revealed no acute ST changes or arrhythmias.  Troponin is normal. A cardiac cause is unlikely.  Influenza/RSV/COVID-19 testing is negative and chest x-ray does not reveal pneumonia or other acute pathology.  He does have a very mild leukocytosis of 11.6 without anemia, kidney injury, significant electrolyte derangements, or hypercarbia.  Wells score is low.  PE is unlikely.  His presentation is most consistent with a viral illness causing exacerbation of his underlying asthma.    Unfortunately Olman did not feel he could wait for discharge instructions when I was with a critical patient and eloped from the emergency department.  This is unfortunate as he would likely have benefited from a steroid burst and certainly would benefit from a refill of a rescue inhaler.  Because he eloped he did not receive any  discharge instructions or return precautions.    Diagnosis:    ICD-10-CM    1. Exacerbation of intermittent asthma, unspecified asthma severity  J45.21          4/9/2023   Renate Phelps MD Dixson, Kylie S, MD  04/21/23 1556

## 2023-04-09 NOTE — ED NOTES
Pt dressed in coat, standing at door stating he was unwilling to wait for doctor and pt refused repeat vital signs. Pt eloped @ 9827.

## 2023-04-10 LAB
ATRIAL RATE - MUSE: 80 BPM
DIASTOLIC BLOOD PRESSURE - MUSE: NORMAL MMHG
INTERPRETATION ECG - MUSE: NORMAL
P AXIS - MUSE: 5 DEGREES
PR INTERVAL - MUSE: 134 MS
QRS DURATION - MUSE: 96 MS
QT - MUSE: 392 MS
QTC - MUSE: 452 MS
R AXIS - MUSE: 93 DEGREES
SYSTOLIC BLOOD PRESSURE - MUSE: NORMAL MMHG
T AXIS - MUSE: 72 DEGREES
VENTRICULAR RATE- MUSE: 80 BPM

## 2023-06-19 RX ORDER — LORAZEPAM 1 MG/1
1 TABLET ORAL EVERY 6 HOURS PRN
Status: ON HOLD | COMMUNITY
End: 2023-07-15

## 2023-06-19 RX ORDER — LOSARTAN POTASSIUM 50 MG/1
50 TABLET ORAL DAILY
COMMUNITY
Start: 2023-04-08

## 2023-06-19 RX ORDER — MELOXICAM 15 MG/1
15 TABLET ORAL DAILY
COMMUNITY
Start: 2023-01-27 | End: 2023-06-19

## 2023-06-19 RX ORDER — CHLORTHALIDONE 25 MG/1
25 TABLET ORAL DAILY
COMMUNITY
Start: 2023-04-08

## 2023-06-19 RX ORDER — ALBUTEROL SULFATE 90 UG/1
1-2 AEROSOL, METERED RESPIRATORY (INHALATION)
COMMUNITY
Start: 2023-04-10

## 2023-06-19 NOTE — PROVIDER NOTIFICATION
06/19/23 1049   Discharge Planning   Patient/Family Anticipates Transition to home with family   Concerns to be Addressed all concerns addressed in this encounter   Living Arrangements   People in Home alone   Type of Residence Private Residence   Number of Stairs, Within Home, Primary greater than 10 stairs   Stair Railings, Within Home, Primary railings safe and in good condition;railing on left side (ascending)   Once home, are you able to live on one level? No   Bathroom Shower/Tub Walk-in shower   Support System   Do you have someone available to stay with you one or two nights once you are home? Yes   Blood   Known Bleeding Disorder or Coagulopathy No   Education   Patient attended total joint pre-op class/received pre-op teaching  online

## 2023-07-14 ENCOUNTER — ANESTHESIA EVENT (OUTPATIENT)
Dept: SURGERY | Facility: CLINIC | Age: 58
End: 2023-07-14
Payer: COMMERCIAL

## 2023-07-14 ENCOUNTER — HOSPITAL ENCOUNTER (OUTPATIENT)
Facility: CLINIC | Age: 58
Discharge: HOME | End: 2023-07-15
Attending: ORTHOPAEDIC SURGERY | Admitting: ORTHOPAEDIC SURGERY
Payer: COMMERCIAL

## 2023-07-14 ENCOUNTER — APPOINTMENT (OUTPATIENT)
Dept: GENERAL RADIOLOGY | Facility: CLINIC | Age: 58
End: 2023-07-14
Attending: PHYSICIAN ASSISTANT
Payer: COMMERCIAL

## 2023-07-14 ENCOUNTER — ANESTHESIA (OUTPATIENT)
Dept: SURGERY | Facility: CLINIC | Age: 58
End: 2023-07-14
Payer: COMMERCIAL

## 2023-07-14 ENCOUNTER — APPOINTMENT (OUTPATIENT)
Dept: PHYSICAL THERAPY | Facility: CLINIC | Age: 58
End: 2023-07-14
Attending: ORTHOPAEDIC SURGERY
Payer: COMMERCIAL

## 2023-07-14 DIAGNOSIS — Z96.651 STATUS POST TOTAL RIGHT KNEE REPLACEMENT: ICD-10-CM

## 2023-07-14 DIAGNOSIS — Z96.651 S/P TKR (TOTAL KNEE REPLACEMENT), RIGHT: Primary | ICD-10-CM

## 2023-07-14 PROBLEM — Z96.659 S/P TOTAL KNEE ARTHROPLASTY: Status: ACTIVE | Noted: 2023-07-14

## 2023-07-14 PROCEDURE — 250N000013 HC RX MED GY IP 250 OP 250 PS 637: Performed by: INTERNAL MEDICINE

## 2023-07-14 PROCEDURE — 250N000009 HC RX 250

## 2023-07-14 PROCEDURE — 258N000001 HC RX 258: Performed by: ORTHOPAEDIC SURGERY

## 2023-07-14 PROCEDURE — 999N000065 XR KNEE PORT RIGHT 1/2 VIEWS: Mod: RT

## 2023-07-14 PROCEDURE — 250N000011 HC RX IP 250 OP 636

## 2023-07-14 PROCEDURE — 97161 PT EVAL LOW COMPLEX 20 MIN: CPT | Mod: GP

## 2023-07-14 PROCEDURE — 250N000009 HC RX 250: Performed by: PHYSICIAN ASSISTANT

## 2023-07-14 PROCEDURE — 360N000077 HC SURGERY LEVEL 4, PER MIN: Performed by: ORTHOPAEDIC SURGERY

## 2023-07-14 PROCEDURE — 97116 GAIT TRAINING THERAPY: CPT | Mod: GP

## 2023-07-14 PROCEDURE — 370N000017 HC ANESTHESIA TECHNICAL FEE, PER MIN: Performed by: ORTHOPAEDIC SURGERY

## 2023-07-14 PROCEDURE — 250N000011 HC RX IP 250 OP 636: Mod: JZ | Performed by: PHYSICIAN ASSISTANT

## 2023-07-14 PROCEDURE — 250N000011 HC RX IP 250 OP 636: Performed by: ANESTHESIOLOGY

## 2023-07-14 PROCEDURE — 250N000011 HC RX IP 250 OP 636: Mod: JZ | Performed by: ANESTHESIOLOGY

## 2023-07-14 PROCEDURE — 258N000003 HC RX IP 258 OP 636: Performed by: PHYSICIAN ASSISTANT

## 2023-07-14 PROCEDURE — C1713 ANCHOR/SCREW BN/BN,TIS/BN: HCPCS | Performed by: ORTHOPAEDIC SURGERY

## 2023-07-14 PROCEDURE — 250N000009 HC RX 250: Performed by: ANESTHESIOLOGY

## 2023-07-14 PROCEDURE — 250N000011 HC RX IP 250 OP 636: Performed by: PHYSICIAN ASSISTANT

## 2023-07-14 PROCEDURE — 999N000141 HC STATISTIC PRE-PROCEDURE NURSING ASSESSMENT: Performed by: ORTHOPAEDIC SURGERY

## 2023-07-14 PROCEDURE — 258N000003 HC RX IP 258 OP 636

## 2023-07-14 PROCEDURE — 272N000001 HC OR GENERAL SUPPLY STERILE: Performed by: ORTHOPAEDIC SURGERY

## 2023-07-14 PROCEDURE — 99207 PR NO CHARGE LOS: CPT

## 2023-07-14 PROCEDURE — 250N000009 HC RX 250: Performed by: ORTHOPAEDIC SURGERY

## 2023-07-14 PROCEDURE — 97530 THERAPEUTIC ACTIVITIES: CPT | Mod: GP

## 2023-07-14 PROCEDURE — 258N000003 HC RX IP 258 OP 636: Performed by: ANESTHESIOLOGY

## 2023-07-14 PROCEDURE — 250N000013 HC RX MED GY IP 250 OP 250 PS 637: Performed by: PHYSICIAN ASSISTANT

## 2023-07-14 PROCEDURE — 710N000009 HC RECOVERY PHASE 1, LEVEL 1, PER MIN: Performed by: ORTHOPAEDIC SURGERY

## 2023-07-14 PROCEDURE — 250N000025 HC SEVOFLURANE, PER MIN: Performed by: ORTHOPAEDIC SURGERY

## 2023-07-14 PROCEDURE — 250N000011 HC RX IP 250 OP 636: Performed by: ORTHOPAEDIC SURGERY

## 2023-07-14 PROCEDURE — 250N000013 HC RX MED GY IP 250 OP 250 PS 637

## 2023-07-14 PROCEDURE — C1776 JOINT DEVICE (IMPLANTABLE): HCPCS | Performed by: ORTHOPAEDIC SURGERY

## 2023-07-14 DEVICE — IMPLANTABLE DEVICE
Type: IMPLANTABLE DEVICE | Site: KNEE | Status: FUNCTIONAL
Brand: PERSONA®

## 2023-07-14 DEVICE — IMPLANTABLE DEVICE
Type: IMPLANTABLE DEVICE | Site: KNEE | Status: FUNCTIONAL
Brand: PERSONA® VIVACIT-E®

## 2023-07-14 DEVICE — FULL DOSE BONE CEMENT, 10 PACK CATALOG NUMBER IS 6191-1-010
Type: IMPLANTABLE DEVICE | Site: KNEE | Status: FUNCTIONAL
Brand: SIMPLEX

## 2023-07-14 DEVICE — IMPLANTABLE DEVICE
Type: IMPLANTABLE DEVICE | Site: KNEE | Status: FUNCTIONAL
Brand: PERSONA® NATURAL TIBIA®

## 2023-07-14 RX ORDER — HYDROMORPHONE HCL IN WATER/PF 6 MG/30 ML
0.2 PATIENT CONTROLLED ANALGESIA SYRINGE INTRAVENOUS EVERY 5 MIN PRN
Status: DISCONTINUED | OUTPATIENT
Start: 2023-07-14 | End: 2023-07-14 | Stop reason: HOSPADM

## 2023-07-14 RX ORDER — FENTANYL CITRATE 0.05 MG/ML
INJECTION, SOLUTION INTRAMUSCULAR; INTRAVENOUS PRN
Status: DISCONTINUED | OUTPATIENT
Start: 2023-07-14 | End: 2023-07-14

## 2023-07-14 RX ORDER — POLYETHYLENE GLYCOL 3350 17 G/17G
1 POWDER, FOR SOLUTION ORAL DAILY
Qty: 10 PACKET | Refills: 0 | Status: SHIPPED | OUTPATIENT
Start: 2023-07-14

## 2023-07-14 RX ORDER — TRANEXAMIC ACID 10 MG/ML
1 INJECTION, SOLUTION INTRAVENOUS ONCE
Status: COMPLETED | OUTPATIENT
Start: 2023-07-14 | End: 2023-07-14

## 2023-07-14 RX ORDER — FENTANYL CITRATE 50 UG/ML
50 INJECTION, SOLUTION INTRAMUSCULAR; INTRAVENOUS EVERY 5 MIN PRN
Status: DISCONTINUED | OUTPATIENT
Start: 2023-07-14 | End: 2023-07-14 | Stop reason: HOSPADM

## 2023-07-14 RX ORDER — BISACODYL 10 MG
10 SUPPOSITORY, RECTAL RECTAL DAILY PRN
Status: DISCONTINUED | OUTPATIENT
Start: 2023-07-14 | End: 2023-07-15 | Stop reason: HOSPADM

## 2023-07-14 RX ORDER — LIDOCAINE HYDROCHLORIDE 20 MG/ML
INJECTION, SOLUTION INFILTRATION; PERINEURAL PRN
Status: DISCONTINUED | OUTPATIENT
Start: 2023-07-14 | End: 2023-07-14

## 2023-07-14 RX ORDER — MELOXICAM 15 MG/1
15 TABLET ORAL DAILY
COMMUNITY

## 2023-07-14 RX ORDER — LABETALOL HYDROCHLORIDE 5 MG/ML
10 INJECTION, SOLUTION INTRAVENOUS
Status: DISCONTINUED | OUTPATIENT
Start: 2023-07-14 | End: 2023-07-14 | Stop reason: HOSPADM

## 2023-07-14 RX ORDER — FENTANYL CITRATE 50 UG/ML
25 INJECTION, SOLUTION INTRAMUSCULAR; INTRAVENOUS EVERY 5 MIN PRN
Status: DISCONTINUED | OUTPATIENT
Start: 2023-07-14 | End: 2023-07-14 | Stop reason: HOSPADM

## 2023-07-14 RX ORDER — LIDOCAINE 40 MG/G
CREAM TOPICAL
Status: DISCONTINUED | OUTPATIENT
Start: 2023-07-14 | End: 2023-07-14 | Stop reason: HOSPADM

## 2023-07-14 RX ORDER — SODIUM CHLORIDE, SODIUM LACTATE, POTASSIUM CHLORIDE, CALCIUM CHLORIDE 600; 310; 30; 20 MG/100ML; MG/100ML; MG/100ML; MG/100ML
INJECTION, SOLUTION INTRAVENOUS CONTINUOUS
Status: DISCONTINUED | OUTPATIENT
Start: 2023-07-14 | End: 2023-07-14 | Stop reason: HOSPADM

## 2023-07-14 RX ORDER — NALOXONE HYDROCHLORIDE 0.4 MG/ML
0.4 INJECTION, SOLUTION INTRAMUSCULAR; INTRAVENOUS; SUBCUTANEOUS
Status: DISCONTINUED | OUTPATIENT
Start: 2023-07-14 | End: 2023-07-15 | Stop reason: HOSPADM

## 2023-07-14 RX ORDER — PROCHLORPERAZINE MALEATE 10 MG
10 TABLET ORAL EVERY 6 HOURS PRN
Status: DISCONTINUED | OUTPATIENT
Start: 2023-07-14 | End: 2023-07-15 | Stop reason: HOSPADM

## 2023-07-14 RX ORDER — ONDANSETRON 4 MG/1
4 TABLET, ORALLY DISINTEGRATING ORAL EVERY 6 HOURS PRN
Status: DISCONTINUED | OUTPATIENT
Start: 2023-07-14 | End: 2023-07-15 | Stop reason: HOSPADM

## 2023-07-14 RX ORDER — LORAZEPAM 0.5 MG/1
1 TABLET ORAL EVERY 6 HOURS PRN
Status: DISCONTINUED | OUTPATIENT
Start: 2023-07-14 | End: 2023-07-15 | Stop reason: HOSPADM

## 2023-07-14 RX ORDER — DEXAMETHASONE SODIUM PHOSPHATE 4 MG/ML
INJECTION, SOLUTION INTRA-ARTICULAR; INTRALESIONAL; INTRAMUSCULAR; INTRAVENOUS; SOFT TISSUE PRN
Status: DISCONTINUED | OUTPATIENT
Start: 2023-07-14 | End: 2023-07-14

## 2023-07-14 RX ORDER — OXYCODONE HYDROCHLORIDE 5 MG/1
5 TABLET ORAL EVERY 4 HOURS PRN
Status: DISCONTINUED | OUTPATIENT
Start: 2023-07-14 | End: 2023-07-15 | Stop reason: HOSPADM

## 2023-07-14 RX ORDER — AMOXICILLIN 250 MG
1 CAPSULE ORAL 2 TIMES DAILY
Status: DISCONTINUED | OUTPATIENT
Start: 2023-07-14 | End: 2023-07-15 | Stop reason: HOSPADM

## 2023-07-14 RX ORDER — GLYCOPYRROLATE 0.2 MG/ML
INJECTION, SOLUTION INTRAMUSCULAR; INTRAVENOUS PRN
Status: DISCONTINUED | OUTPATIENT
Start: 2023-07-14 | End: 2023-07-14

## 2023-07-14 RX ORDER — NALOXONE HYDROCHLORIDE 0.4 MG/ML
0.2 INJECTION, SOLUTION INTRAMUSCULAR; INTRAVENOUS; SUBCUTANEOUS
Status: DISCONTINUED | OUTPATIENT
Start: 2023-07-14 | End: 2023-07-15 | Stop reason: HOSPADM

## 2023-07-14 RX ORDER — HYDROXYZINE HYDROCHLORIDE 25 MG/1
25 TABLET, FILM COATED ORAL EVERY 6 HOURS PRN
Status: DISCONTINUED | OUTPATIENT
Start: 2023-07-14 | End: 2023-07-15 | Stop reason: HOSPADM

## 2023-07-14 RX ORDER — HYDROXYZINE HYDROCHLORIDE 25 MG/1
25 TABLET, FILM COATED ORAL EVERY 6 HOURS PRN
Qty: 30 TABLET | Refills: 0 | Status: SHIPPED | OUTPATIENT
Start: 2023-07-14

## 2023-07-14 RX ORDER — ASPIRIN 325 MG
325 TABLET, DELAYED RELEASE (ENTERIC COATED) ORAL DAILY
Qty: 30 TABLET | Refills: 0 | Status: SHIPPED | OUTPATIENT
Start: 2023-07-14

## 2023-07-14 RX ORDER — ONDANSETRON 2 MG/ML
4 INJECTION INTRAMUSCULAR; INTRAVENOUS EVERY 6 HOURS PRN
Status: DISCONTINUED | OUTPATIENT
Start: 2023-07-14 | End: 2023-07-15 | Stop reason: HOSPADM

## 2023-07-14 RX ORDER — POLYETHYLENE GLYCOL 3350 17 G/17G
17 POWDER, FOR SOLUTION ORAL DAILY
Status: DISCONTINUED | OUTPATIENT
Start: 2023-07-15 | End: 2023-07-15 | Stop reason: HOSPADM

## 2023-07-14 RX ORDER — TRANEXAMIC ACID 650 MG/1
1950 TABLET ORAL ONCE
Status: COMPLETED | OUTPATIENT
Start: 2023-07-14 | End: 2023-07-14

## 2023-07-14 RX ORDER — ACETAMINOPHEN 325 MG/1
975 TABLET ORAL EVERY 8 HOURS
Status: DISCONTINUED | OUTPATIENT
Start: 2023-07-14 | End: 2023-07-15 | Stop reason: HOSPADM

## 2023-07-14 RX ORDER — ONDANSETRON 2 MG/ML
INJECTION INTRAMUSCULAR; INTRAVENOUS PRN
Status: DISCONTINUED | OUTPATIENT
Start: 2023-07-14 | End: 2023-07-14

## 2023-07-14 RX ORDER — ACETAMINOPHEN 325 MG/1
975 TABLET ORAL ONCE
Status: COMPLETED | OUTPATIENT
Start: 2023-07-14 | End: 2023-07-14

## 2023-07-14 RX ORDER — PROPOFOL 10 MG/ML
INJECTION, EMULSION INTRAVENOUS PRN
Status: DISCONTINUED | OUTPATIENT
Start: 2023-07-14 | End: 2023-07-14

## 2023-07-14 RX ORDER — AMOXICILLIN 250 MG
1-2 CAPSULE ORAL 2 TIMES DAILY
Qty: 30 TABLET | Refills: 0 | Status: SHIPPED | OUTPATIENT
Start: 2023-07-14

## 2023-07-14 RX ORDER — LOSARTAN POTASSIUM 50 MG/1
50 TABLET ORAL DAILY
Status: DISCONTINUED | OUTPATIENT
Start: 2023-07-14 | End: 2023-07-15 | Stop reason: HOSPADM

## 2023-07-14 RX ORDER — DEXMEDETOMIDINE HYDROCHLORIDE 4 UG/ML
INJECTION, SOLUTION INTRAVENOUS PRN
Status: DISCONTINUED | OUTPATIENT
Start: 2023-07-14 | End: 2023-07-14

## 2023-07-14 RX ORDER — ONDANSETRON 2 MG/ML
4 INJECTION INTRAMUSCULAR; INTRAVENOUS EVERY 30 MIN PRN
Status: DISCONTINUED | OUTPATIENT
Start: 2023-07-14 | End: 2023-07-14 | Stop reason: HOSPADM

## 2023-07-14 RX ORDER — OXYCODONE HYDROCHLORIDE 5 MG/1
5-10 TABLET ORAL EVERY 4 HOURS PRN
Qty: 30 TABLET | Refills: 0 | Status: SHIPPED | OUTPATIENT
Start: 2023-07-14

## 2023-07-14 RX ORDER — VANCOMYCIN HYDROCHLORIDE 1 G/20ML
INJECTION, POWDER, LYOPHILIZED, FOR SOLUTION INTRAVENOUS PRN
Status: DISCONTINUED | OUTPATIENT
Start: 2023-07-14 | End: 2023-07-14 | Stop reason: HOSPADM

## 2023-07-14 RX ORDER — CHLORTHALIDONE 25 MG/1
25 TABLET ORAL DAILY
Status: DISCONTINUED | OUTPATIENT
Start: 2023-07-14 | End: 2023-07-15 | Stop reason: HOSPADM

## 2023-07-14 RX ORDER — CEFAZOLIN SODIUM/WATER 2 G/20 ML
2 SYRINGE (ML) INTRAVENOUS SEE ADMIN INSTRUCTIONS
Status: DISCONTINUED | OUTPATIENT
Start: 2023-07-14 | End: 2023-07-14 | Stop reason: HOSPADM

## 2023-07-14 RX ORDER — PROPOFOL 10 MG/ML
INJECTION, EMULSION INTRAVENOUS CONTINUOUS PRN
Status: DISCONTINUED | OUTPATIENT
Start: 2023-07-14 | End: 2023-07-14

## 2023-07-14 RX ORDER — QUETIAPINE FUMARATE 100 MG/1
100 TABLET, FILM COATED ORAL AT BEDTIME
Status: DISCONTINUED | OUTPATIENT
Start: 2023-07-14 | End: 2023-07-15 | Stop reason: HOSPADM

## 2023-07-14 RX ORDER — ONDANSETRON 4 MG/1
4 TABLET, ORALLY DISINTEGRATING ORAL EVERY 30 MIN PRN
Status: DISCONTINUED | OUTPATIENT
Start: 2023-07-14 | End: 2023-07-14 | Stop reason: HOSPADM

## 2023-07-14 RX ORDER — BUPIVACAINE HYDROCHLORIDE 7.5 MG/ML
INJECTION, SOLUTION INTRASPINAL
Status: COMPLETED | OUTPATIENT
Start: 2023-07-14 | End: 2023-07-14

## 2023-07-14 RX ORDER — CEFAZOLIN SODIUM/WATER 2 G/20 ML
2 SYRINGE (ML) INTRAVENOUS
Status: COMPLETED | OUTPATIENT
Start: 2023-07-14 | End: 2023-07-14

## 2023-07-14 RX ORDER — ALBUTEROL SULFATE 90 UG/1
1-2 AEROSOL, METERED RESPIRATORY (INHALATION) EVERY 4 HOURS PRN
Status: DISCONTINUED | OUTPATIENT
Start: 2023-07-14 | End: 2023-07-15 | Stop reason: HOSPADM

## 2023-07-14 RX ORDER — TRAZODONE HYDROCHLORIDE 100 MG/1
100-200 TABLET ORAL
Status: DISCONTINUED | OUTPATIENT
Start: 2023-07-14 | End: 2023-07-15 | Stop reason: HOSPADM

## 2023-07-14 RX ORDER — CEFAZOLIN SODIUM 2 G/100ML
2 INJECTION, SOLUTION INTRAVENOUS EVERY 8 HOURS
Status: COMPLETED | OUTPATIENT
Start: 2023-07-14 | End: 2023-07-15

## 2023-07-14 RX ORDER — CALCIUM CARBONATE 500 MG/1
500 TABLET, CHEWABLE ORAL 4 TIMES DAILY PRN
Status: DISCONTINUED | OUTPATIENT
Start: 2023-07-14 | End: 2023-07-15 | Stop reason: HOSPADM

## 2023-07-14 RX ORDER — HYDROMORPHONE HCL IN WATER/PF 6 MG/30 ML
0.2 PATIENT CONTROLLED ANALGESIA SYRINGE INTRAVENOUS
Status: DISCONTINUED | OUTPATIENT
Start: 2023-07-14 | End: 2023-07-15

## 2023-07-14 RX ORDER — HYDROMORPHONE HCL IN WATER/PF 6 MG/30 ML
0.4 PATIENT CONTROLLED ANALGESIA SYRINGE INTRAVENOUS EVERY 5 MIN PRN
Status: DISCONTINUED | OUTPATIENT
Start: 2023-07-14 | End: 2023-07-14 | Stop reason: HOSPADM

## 2023-07-14 RX ORDER — HYDROMORPHONE HCL IN WATER/PF 6 MG/30 ML
0.4 PATIENT CONTROLLED ANALGESIA SYRINGE INTRAVENOUS
Status: DISCONTINUED | OUTPATIENT
Start: 2023-07-14 | End: 2023-07-15

## 2023-07-14 RX ORDER — ASPIRIN 325 MG
325 TABLET, DELAYED RELEASE (ENTERIC COATED) ORAL DAILY
Status: DISCONTINUED | OUTPATIENT
Start: 2023-07-14 | End: 2023-07-15 | Stop reason: HOSPADM

## 2023-07-14 RX ORDER — OXYCODONE HYDROCHLORIDE 10 MG/1
10 TABLET ORAL EVERY 4 HOURS PRN
Status: DISCONTINUED | OUTPATIENT
Start: 2023-07-14 | End: 2023-07-15 | Stop reason: HOSPADM

## 2023-07-14 RX ORDER — QUETIAPINE FUMARATE 25 MG/1
25 TABLET, FILM COATED ORAL 2 TIMES DAILY PRN
Status: DISCONTINUED | OUTPATIENT
Start: 2023-07-14 | End: 2023-07-15 | Stop reason: HOSPADM

## 2023-07-14 RX ORDER — ACETAMINOPHEN 325 MG/1
650 TABLET ORAL EVERY 4 HOURS PRN
Qty: 100 TABLET | Refills: 0 | Status: SHIPPED | OUTPATIENT
Start: 2023-07-14

## 2023-07-14 RX ORDER — SODIUM CHLORIDE, SODIUM LACTATE, POTASSIUM CHLORIDE, CALCIUM CHLORIDE 600; 310; 30; 20 MG/100ML; MG/100ML; MG/100ML; MG/100ML
INJECTION, SOLUTION INTRAVENOUS CONTINUOUS
Status: DISCONTINUED | OUTPATIENT
Start: 2023-07-14 | End: 2023-07-15 | Stop reason: HOSPADM

## 2023-07-14 RX ORDER — BUPIVACAINE HYDROCHLORIDE AND EPINEPHRINE 2.5; 5 MG/ML; UG/ML
INJECTION, SOLUTION INFILTRATION; PERINEURAL
Status: COMPLETED | OUTPATIENT
Start: 2023-07-14 | End: 2023-07-14

## 2023-07-14 RX ORDER — LIDOCAINE 40 MG/G
CREAM TOPICAL
Status: DISCONTINUED | OUTPATIENT
Start: 2023-07-14 | End: 2023-07-15 | Stop reason: HOSPADM

## 2023-07-14 RX ORDER — ROPIVACAINE HYDROCHLORIDE 5 MG/ML
INJECTION, SOLUTION EPIDURAL; INFILTRATION; PERINEURAL
Status: COMPLETED | OUTPATIENT
Start: 2023-07-14 | End: 2023-07-14

## 2023-07-14 RX ADMIN — GLYCOPYRROLATE 0.2 MG: 0.2 INJECTION, SOLUTION INTRAMUSCULAR; INTRAVENOUS at 08:56

## 2023-07-14 RX ADMIN — Medication 4 MCG: at 09:20

## 2023-07-14 RX ADMIN — HYDROXYZINE HYDROCHLORIDE 25 MG: 25 TABLET, FILM COATED ORAL at 17:28

## 2023-07-14 RX ADMIN — Medication 2 G: at 08:45

## 2023-07-14 RX ADMIN — HYDROXYZINE HYDROCHLORIDE 25 MG: 25 TABLET, FILM COATED ORAL at 23:59

## 2023-07-14 RX ADMIN — SODIUM CHLORIDE, POTASSIUM CHLORIDE, SODIUM LACTATE AND CALCIUM CHLORIDE: 600; 310; 30; 20 INJECTION, SOLUTION INTRAVENOUS at 13:37

## 2023-07-14 RX ADMIN — DEXAMETHASONE SODIUM PHOSPHATE 4 MG: 4 INJECTION, SOLUTION INTRA-ARTICULAR; INTRALESIONAL; INTRAMUSCULAR; INTRAVENOUS; SOFT TISSUE at 09:16

## 2023-07-14 RX ADMIN — HYDROMORPHONE HYDROCHLORIDE 0.4 MG: 0.2 INJECTION, SOLUTION INTRAMUSCULAR; INTRAVENOUS; SUBCUTANEOUS at 13:24

## 2023-07-14 RX ADMIN — FENTANYL CITRATE 50 MCG: 50 INJECTION INTRAVENOUS at 09:56

## 2023-07-14 RX ADMIN — OXYCODONE HYDROCHLORIDE 5 MG: 5 TABLET ORAL at 15:04

## 2023-07-14 RX ADMIN — CEFAZOLIN SODIUM 2 G: 2 INJECTION, SOLUTION INTRAVENOUS at 16:29

## 2023-07-14 RX ADMIN — OXYCODONE HYDROCHLORIDE 5 MG: 5 TABLET ORAL at 11:06

## 2023-07-14 RX ADMIN — MIDAZOLAM 2 MG: 1 INJECTION INTRAMUSCULAR; INTRAVENOUS at 08:20

## 2023-07-14 RX ADMIN — SODIUM CHLORIDE, POTASSIUM CHLORIDE, SODIUM LACTATE AND CALCIUM CHLORIDE: 600; 310; 30; 20 INJECTION, SOLUTION INTRAVENOUS at 08:45

## 2023-07-14 RX ADMIN — FENTANYL CITRATE 50 MCG: 50 INJECTION, SOLUTION INTRAMUSCULAR; INTRAVENOUS at 11:27

## 2023-07-14 RX ADMIN — BUPIVACAINE HYDROCHLORIDE AND EPINEPHRINE BITARTRATE 15 ML: 2.5; .005 INJECTION, SOLUTION INFILTRATION; PERINEURAL at 08:20

## 2023-07-14 RX ADMIN — TRANEXAMIC ACID 1 G: 10 INJECTION, SOLUTION INTRAVENOUS at 10:00

## 2023-07-14 RX ADMIN — Medication 6 MCG: at 09:10

## 2023-07-14 RX ADMIN — PHENYLEPHRINE HYDROCHLORIDE 100 MCG: 10 INJECTION INTRAVENOUS at 09:56

## 2023-07-14 RX ADMIN — TRANEXAMIC ACID 1950 MG: 650 TABLET ORAL at 07:36

## 2023-07-14 RX ADMIN — Medication 4 MCG: at 09:17

## 2023-07-14 RX ADMIN — Medication 4 MCG: at 09:13

## 2023-07-14 RX ADMIN — TRAZODONE HYDROCHLORIDE 100 MG: 100 TABLET ORAL at 23:59

## 2023-07-14 RX ADMIN — ONDANSETRON 4 MG: 2 INJECTION INTRAMUSCULAR; INTRAVENOUS at 08:56

## 2023-07-14 RX ADMIN — HYDROMORPHONE HYDROCHLORIDE 0.2 MG: 0.2 INJECTION, SOLUTION INTRAMUSCULAR; INTRAVENOUS; SUBCUTANEOUS at 16:40

## 2023-07-14 RX ADMIN — MIDAZOLAM 2 MG: 1 INJECTION INTRAMUSCULAR; INTRAVENOUS at 08:45

## 2023-07-14 RX ADMIN — PHENYLEPHRINE HYDROCHLORIDE 100 MCG: 10 INJECTION INTRAVENOUS at 09:53

## 2023-07-14 RX ADMIN — HYDROMORPHONE HYDROCHLORIDE 0.4 MG: 0.2 INJECTION, SOLUTION INTRAMUSCULAR; INTRAVENOUS; SUBCUTANEOUS at 14:26

## 2023-07-14 RX ADMIN — HYDROMORPHONE HYDROCHLORIDE 0.4 MG: 0.2 INJECTION, SOLUTION INTRAMUSCULAR; INTRAVENOUS; SUBCUTANEOUS at 12:32

## 2023-07-14 RX ADMIN — HYDROMORPHONE HYDROCHLORIDE 0.4 MG: 0.2 INJECTION, SOLUTION INTRAMUSCULAR; INTRAVENOUS; SUBCUTANEOUS at 19:51

## 2023-07-14 RX ADMIN — Medication 4 MCG: at 09:22

## 2023-07-14 RX ADMIN — HYDROXYZINE HYDROCHLORIDE 25 MG: 25 TABLET, FILM COATED ORAL at 11:06

## 2023-07-14 RX ADMIN — OXYCODONE HYDROCHLORIDE 10 MG: 10 TABLET ORAL at 18:25

## 2023-07-14 RX ADMIN — FENTANYL CITRATE 50 MCG: 50 INJECTION, SOLUTION INTRAMUSCULAR; INTRAVENOUS at 11:01

## 2023-07-14 RX ADMIN — SENNOSIDES AND DOCUSATE SODIUM 1 TABLET: 50; 8.6 TABLET ORAL at 19:51

## 2023-07-14 RX ADMIN — PROPOFOL 200 MG: 10 INJECTION, EMULSION INTRAVENOUS at 09:05

## 2023-07-14 RX ADMIN — LIDOCAINE HYDROCHLORIDE 50 MG: 20 INJECTION, SOLUTION INFILTRATION; PERINEURAL at 08:52

## 2023-07-14 RX ADMIN — PROPOFOL 150 MCG/KG/MIN: 10 INJECTION, EMULSION INTRAVENOUS at 08:52

## 2023-07-14 RX ADMIN — ACETAMINOPHEN 975 MG: 325 TABLET, FILM COATED ORAL at 07:36

## 2023-07-14 RX ADMIN — QUETIAPINE 100 MG: 100 TABLET ORAL at 22:04

## 2023-07-14 RX ADMIN — LORAZEPAM 1 MG: 0.5 TABLET ORAL at 18:25

## 2023-07-14 RX ADMIN — FENTANYL CITRATE 50 MCG: 50 INJECTION INTRAVENOUS at 09:16

## 2023-07-14 RX ADMIN — ROPIVACAINE HYDROCHLORIDE 15 ML: 5 INJECTION, SOLUTION EPIDURAL; INFILTRATION; PERINEURAL at 08:20

## 2023-07-14 RX ADMIN — ACETAMINOPHEN 975 MG: 325 TABLET, FILM COATED ORAL at 16:28

## 2023-07-14 RX ADMIN — BUPIVACAINE HYDROCHLORIDE IN DEXTROSE 1.6 ML: 7.5 INJECTION, SOLUTION SUBARACHNOID at 08:52

## 2023-07-14 RX ADMIN — OXYCODONE HYDROCHLORIDE 10 MG: 10 TABLET ORAL at 22:05

## 2023-07-14 RX ADMIN — ASPIRIN 325 MG: 325 TABLET, COATED ORAL at 16:28

## 2023-07-14 ASSESSMENT — ACTIVITIES OF DAILY LIVING (ADL)
ADLS_ACUITY_SCORE: 22
ADLS_ACUITY_SCORE: 22
ADLS_ACUITY_SCORE: 27
ADLS_ACUITY_SCORE: 22
ADLS_ACUITY_SCORE: 27
ADLS_ACUITY_SCORE: 27
ADLS_ACUITY_SCORE: 22

## 2023-07-14 ASSESSMENT — COLUMBIA-SUICIDE SEVERITY RATING SCALE - C-SSRS
3. HAVE YOU BEEN THINKING ABOUT HOW YOU MIGHT KILL YOURSELF?: NO
1. IN THE PAST MONTH, HAVE YOU WISHED YOU WERE DEAD OR WISHED YOU COULD GO TO SLEEP AND NOT WAKE UP?: NO
2. HAVE YOU ACTUALLY HAD ANY THOUGHTS OF KILLING YOURSELF IN THE PAST MONTH?: NO
6. HAVE YOU EVER DONE ANYTHING, STARTED TO DO ANYTHING, OR PREPARED TO DO ANYTHING TO END YOUR LIFE?: NO
5. HAVE YOU STARTED TO WORK OUT OR WORKED OUT THE DETAILS OF HOW TO KILL YOURSELF? DO YOU INTEND TO CARRY OUT THIS PLAN?: NO
4. HAVE YOU HAD THESE THOUGHTS AND HAD SOME INTENTION OF ACTING ON THEM?: NO

## 2023-07-14 ASSESSMENT — ENCOUNTER SYMPTOMS: DYSRHYTHMIAS: 0

## 2023-07-14 NOTE — OP NOTE
Procedure Date: 07/14/2023    PREOPERATIVE DIAGNOSES:    1.  Osteoarthritis, right knee.  2.  Schizophrenia.    POSTOPERATIVE DIAGNOSES:    1.  Osteoarthritis, right knee.  2.  Schizophrenia.    PROCEDURE PERFORMED:  Right total knee arthroplasty.    SURGEON:  Tomer Gamble MD    ASSISTANT:  Brittany Cleaning PA-C    ANESTHESIA:  Spinal converted to general with adductor block.    ESTIMATED BLOOD LOSS:  50 milliliters.    TOURNIQUET TIME:  35 minutes.    COMPLICATIONS:  None.    DESCRIPTION OF PROCEDURE:  The patient was taken to the operating room where after administration of antibiotic prophylaxis, tranexamic acid, sterile prep and drape, the leg was exsanguinated and an anterior incision was made, followed by a medial arthrotomy.  Minimal medial release was performed.  There was a root tear with extensive medial meniscus tearing and high-grade near 4 chondromalacia of the entire medial femoral condyle.  An intramedullary 5-degree guide was used with anterior referencing at 5 degrees of external rotation, which best matched the epicondylar axis.  The PCL was retained.  Retractors were carefully placed about the proximal tibia and a cut was made perpendicular to mechanical axis of the tibia, removing approximately 3 millimeters of medial bone.  Tibial preparation was done at the junction of the medial and middle thirds of the tubercle and 9 millimeters resected from the undersurface of a 23 millimeter patella and sized appropriately.  Gaps were equal.  Posterior medial osteophytes were removed and a capsular injection was performed.    After copious lavage and drying, Simplex cementing was used for a Beth Persona size 10 standard with cruciate retaining femur, size F tibia, 11 millimeter polyethylene insert, and a 35 millimeter patellar button.  Range of motion, balance and tracking were all excellent.  Copious lavage was performed with saline followed by a Betadine soak.  One gram of vancomycin powder was  placed in the capsule, followed by a layered anatomic closure.  There were no complications.    Tomer Gamble MD        D: 2023   T: 2023   MT: nita    Name:     LIZET KELLOGG  MRN:      [id number]        Account:        [id number]   :      1965           Procedure Date: 2023     Document: [id number]

## 2023-07-14 NOTE — ANESTHESIA POSTPROCEDURE EVALUATION
Patient: Olman Condon    Procedure: Procedure(s):  Right total knee arthroplasty       Anesthesia Type:  Spinal    Note:  Disposition: Inpatient   Postop Pain Control: Uneventful            Sign Out: Well controlled pain   PONV: No   Neuro/Psych: Uneventful            Sign Out: Acceptable/Baseline neuro status   Airway/Respiratory: Uneventful            Sign Out: Acceptable/Baseline resp. status   CV/Hemodynamics: Uneventful            Sign Out: Acceptable CV status; No obvious hypovolemia; No obvious fluid overload   Other NRE:    DID A NON-ROUTINE EVENT OCCUR?            Last vitals:  Vitals Value Taken Time   /89 07/14/23 1305   Temp 96.5  F (35.8  C) 07/14/23 1020   Pulse 66 07/14/23 1316   Resp 13 07/14/23 1316   SpO2 98 % 07/14/23 1316   Vitals shown include unvalidated device data.    Electronically Signed By: Dillan Bautista MD  July 14, 2023  1:17 PM

## 2023-07-14 NOTE — BRIEF OP NOTE
TKR for Pre/Post OA knee  Tye  TT est 35 w  (see dictation for full)  Spec none  No anticipated complications

## 2023-07-14 NOTE — PROGRESS NOTES
07/14/23 1703   Appointment Info   Signing Clinician's Name / Credentials (PT) Fidelina Reynolds DPT   Living Environment   People in Home alone   Current Living Arrangements house   Home Accessibility stairs within home   Number of Stairs, Within Home, Primary greater than 10 stairs   Stair Railings, Within Home, Primary railings safe and in good condition;railing on left side (ascending)   Transportation Anticipated family or friend will provide   Living Environment Comments Pt lives alone, has 3 flights of stairs. Pt going to stay with his daughter after discharge.   Self-Care   Usual Activity Tolerance good   Current Activity Tolerance fair   Equipment Currently Used at Home cane, straight;walker, standard   Fall history within last six months no   Activity/Exercise/Self-Care Comment Pt IND at baseline   General Information   Onset of Illness/Injury or Date of Surgery 07/14/23   Referring Physician Brittany Cleaning, BARRY   Patient/Family Therapy Goals Statement (PT) Return home   Pertinent History of Current Problem (include personal factors and/or comorbidities that impact the POC) Pt is a 58 y/o male POD #0 following R TKA   Existing Precautions/Restrictions fall   General Observations Pt in recliner upon therapist arrival, agreeable to PT   Cognition   Affect/Mental Status (Cognition) WFL   Orientation Status (Cognition) oriented x 4   Follows Commands (Cognition) WFL   Pain Assessment   Patient Currently in Pain   (5/10 R knee)   Integumentary/Edema   Integumentary/Edema Comments Incision not observed, covered by bandage   Posture    Posture Forward head position;Protracted shoulders   Range of Motion (ROM)   Range of Motion ROM deficits secondary to surgical procedure   ROM Comment R knee AROM approx 15-60 degrees   Strength (Manual Muscle Testing)   Strength (Manual Muscle Testing) Able to perform R SLR;Able to perform L SLR;Deficits observed during functional mobility   Bed Mobility   Comment, (Bed  Mobility) Not tested as pt in recliner upon therapist arrival, and ending session in recliner   Transfers   Comment, (Transfers) Sit to stand CGA   Gait/Stairs (Locomotion)   Comment, (Gait/Stairs) Pt amb w/ FWW and CGA   Balance   Balance Comments Good seated and fair standing w/ FWW   Sensory Examination   Sensory Perception patient reports no sensory changes   Clinical Impression   Criteria for Skilled Therapeutic Intervention Yes, treatment indicated   PT Diagnosis (PT) Impaired functional mobility and gait   Influenced by the following impairments Pain, weakness, decreased activity tolerance, impaired balance, decreased ROM   Functional limitations due to impairments Limited functional mobility requiring AD and assist   Clinical Presentation (PT Evaluation Complexity) Stable/Uncomplicated   Clinical Presentation Rationale Based on PMH, current status, and social support   Clinical Decision Making (Complexity) low complexity   Planned Therapy Interventions (PT) balance training;bed mobility training;gait training;home exercise program;stair training;strengthening;transfer training;progressive activity/exercise   Risk & Benefits of therapy have been explained evaluation/treatment results reviewed;care plan/treatment goals reviewed;risks/benefits reviewed;current/potential barriers reviewed;participants voiced agreement with care plan;participants included;patient   PT Total Evaluation Time   PT Eval, Low Complexity Minutes (02687) 10   Plan of Care Review   Plan of Care Reviewed With patient   Physical Therapy Goals   PT Frequency Daily   PT Predicted Duration/Target Date for Goal Attainment 07/16/23   PT Goals Bed Mobility;Transfers;Gait;Stairs   PT: Bed Mobility Modified independent;Supine to/from sit   PT: Transfers Modified independent;Sit to/from stand;Assistive device   PT: Gait Supervision/stand-by assist;Assistive device;150 feet   PT: Stairs Supervision/stand-by assist;Greater than 10 stairs    Interventions   Interventions Quick Adds Therapeutic Activity;Gait Training   Therapeutic Activity   Therapeutic Activities: dynamic activities to improve functional performance Minutes (13737) 10   Symptoms Noted During/After Treatment Increased pain   Treatment Detail/Skilled Intervention Time managing lines. Pt performed APs, quad sets, and heel slides in recliner. Pt performed sit <> stand from recliner x4 reps w/ FWW and CGA. Working on hand placement and controlling descent. Pt able to shift hips back in recliner. Chair alarm on, LE elevated, ice on knee, all needs w/in reach, RN updated.   Gait Training   Gait Training Minutes (30503) 10   Symptoms Noted During/After Treatment (Gait Training) fatigue   Treatment Detail/Skilled Intervention Pt performed standing weight shifting and marches w/ FWW and CGA. Pt amb 180' w/ FWW and CGA. Pt demos fair speed and stability, step through pattern, VCs for upright posture.   PT Discharge Planning   PT Plan Assess bed mob; progress transfers and amb; trial stairs; review HEP   PT Discharge Recommendation (DC Rec)   (Defer to Ortho)   PT Rationale for DC Rec Pt below baseline, currently A x1 w/ mobility. Anticipate w/ further IP PT pt will meet goals. Pt has good assist from daughter. Recommend pt use AD for all mobility.   PT Brief overview of current status CGA   Total Session Time   Timed Code Treatment Minutes 20   Total Session Time (sum of timed and untimed services) 30

## 2023-07-14 NOTE — PROGRESS NOTES
Brief hospitalist consult note:    Olman is a 57-year-old male with a past medical history of bipolar 2 disorder, depression, hypertension, anxiety that is status post her right total knee arthroplasty.  No surgical complications noted.  EBL 50 mL.    - held hydrochlorothiazide and losartan today, likely resume tomorrow   - continue pta seroquel and PRN trazodone    Full consult to follow in the AM.     JUSTIN Negron PA-C on 7/14/2023 at 5:41 PM

## 2023-07-14 NOTE — ANESTHESIA PROCEDURE NOTES
Adductor canal Procedure Note    Pre-Procedure   Staff -        Anesthesiologist:  Dillan Bautista MD       Performed By: anesthesiologist       Referred By: Tye       Location: pre-op       Procedure Start/Stop Times: 7/14/2023 8:20 AM and 7/14/2023 8:30 AM  Procedure Documentation  Procedure: Adductor canal       Laterality: right       Patient Position: supine       Patient Prep/Sterile Barriers: sterile gloves, mask       Skin prep: Betadine       Needle Type: insulated and short bevel       Needle Gauge: 21.        Needle Length (Inches): 4        Ultrasound guided       1. Ultrasound was used to identify targeted nerve, plexus, vascular marker, or fascial plane and place a needle adjacent to it in real-time.       2. Ultrasound was used to visualize the spread of anesthetic in close proximity to the above referenced structure.    Assessment/Narrative         The placement was negative for: blood aspirated, painful injection and site bleeding       Paresthesias: No.       Test dose of mL at.         Test dose negative, 3 minutes after injection, for signs of intravascular, subdural, or intrathecal injection.       Bolus given via needle..        Secured via.        Insertion/Infusion Method: Single Shot       Complications: none       Injection made incrementally with aspirations every 3 mL.    Medication(s) Administered   Bupivacaine 0.25% w/ 1:200K Epi (Injection) - Injection   15 mL - 7/14/2023 8:20:00 AM  Ropivacaine 0.5% PF (Infiltration) - Infiltration   15 mL - 7/14/2023 8:20:00 AM  Medication Administration Time: 7/14/2023 8:20 AM     Comments:  The surgeon has given a verbal order transferring care of this patient to me for the performance of a regional analgesia block for post-op pain control. It is requested of me because I am uniquely trained and qualified to perform this block and the surgeon is neither trained nor qualified to perform this procedure.         FOR [facility] [place]:  "  Pain Team Contact information: please page the Pain Team Via [facility]. Search \"Pain\". During daytime hours, please page the attending first. At night please page the resident first.      "

## 2023-07-14 NOTE — INTERVAL H&P NOTE
"I have reviewed the surgical (or preoperative) H&P that is linked to this encounter, and examined the patient. There are no significant changes    Clinical Conditions Present on Arrival:  Clinically Significant Risk Factors Present on Admission                  # Obesity: Estimated body mass index is 32.86 kg/m  as calculated from the following:    Height as of this encounter: 1.803 m (5' 11\").    Weight as of this encounter: 106.9 kg (235 lb 9.6 oz).       "

## 2023-07-14 NOTE — ANESTHESIA PROCEDURE NOTES
Airway       Patient location during procedure: OR  Staff -        CRNA: Roxi Figueroa APRN CRNA       Performed By: CRNA  Consent for Airway        Urgency: elective  Indications and Patient Condition       Indications for airway management: storm-procedural       Induction type:intravenous       Mask difficulty assessment: 0 - not attempted    Final Airway Details       Final airway type: supraglottic airway    Supraglottic Airway Details        Type: LMA       Brand: LMA Unique       LMA size: 5    Post intubation assessment        Placement verified by: capnometry, equal breath sounds and chest rise        Number of attempts at approach: 2       Number of other approaches attempted: 0       Secured with: plastic tape       Ease of procedure: easy       Dentition: Unchanged    Additional Comments       1 attempt with igel 5. TV low. Switched to LMA unique, improved TV.

## 2023-07-14 NOTE — ANESTHESIA CARE TRANSFER NOTE
Patient: Olman Condon    Procedure: Procedure(s):  Right total knee arthroplasty       Diagnosis: Osteoarthritis of right knee [M17.11]  Diagnosis Additional Information: No value filed.    Anesthesia Type:   Spinal     Note:    Oropharynx: oral airway in place  Level of Consciousness: drowsy  Oxygen Supplementation: face mask  Level of Supplemental Oxygen (L/min / FiO2): 6  Independent Airway: airway patency satisfactory and stable  Dentition: dentition unchanged  Vital Signs Stable: post-procedure vital signs reviewed and stable  Report to RN Given: handoff report given  Patient transferred to: PACU    Handoff Report: Identifed the Patient, Identified the Reponsible Provider, Reviewed the pertinent medical history, Discussed the surgical course, Reviewed Intra-OP anesthesia mangement and issues during anesthesia, Set expectations for post-procedure period and Allowed opportunity for questions and acknowledgement of understanding      Vitals:  Vitals Value Taken Time   /62 07/14/23 1020   Temp 96.5  F (35.8  C) 07/14/23 1020   Pulse 87 07/14/23 1022   Resp 15 07/14/23 1022   SpO2 96 % 07/14/23 1022   Vitals shown include unvalidated device data.    Electronically Signed By: SHYANN Boone CRNA  July 14, 2023  10:23 AM
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
## 2023-07-14 NOTE — ANESTHESIA PROCEDURE NOTES
"Intrathecal injection Procedure Note    Pre-Procedure   Staff -        Anesthesiologist:  Dillan Bautista MD       Performed By: anesthesiologist       Referred By: Tye       Location: OR       Pre-Anesthestic Checklist: patient identified, IV checked, risks and benefits discussed, informed consent, monitors and equipment checked, pre-op evaluation, at physician/surgeon's request and post-op pain management  Timeout:       Correct Patient: Yes        Correct Procedure: Yes        Correct Site: Yes        Correct Position: Yes   Procedure Documentation  Procedure: intrathecal injection       Patient Position: sitting       Skin prep: Betadine       Insertion Site: L3-4. (midline approach).       Needle Gauge: 22.        Needle Length (Inches): 3.5        Spinal Needle Type: Devan tipNo introducer used       # of attempts: 1 and  # of redirects:  0    Assessment/Narrative         Paresthesias: No.       CSF fluid: clear.    Medication(s) Administered   0.75% Hyperbaric Bupivacaine (Intrathecal) - Intrathecal   1.6 mL - 7/14/2023 8:52:00 AM    FOR [facility] ([facility] [place]) ONLY:   Pain Team Contact information: please page the Pain Team Via [facility]. Search \"Pain\". During daytime hours, please page the attending first. At night please page the resident first.      "

## 2023-07-15 ENCOUNTER — APPOINTMENT (OUTPATIENT)
Dept: PHYSICAL THERAPY | Facility: CLINIC | Age: 58
End: 2023-07-15
Attending: ORTHOPAEDIC SURGERY
Payer: COMMERCIAL

## 2023-07-15 ENCOUNTER — APPOINTMENT (OUTPATIENT)
Dept: OCCUPATIONAL THERAPY | Facility: CLINIC | Age: 58
End: 2023-07-15
Attending: ORTHOPAEDIC SURGERY
Payer: COMMERCIAL

## 2023-07-15 VITALS
SYSTOLIC BLOOD PRESSURE: 160 MMHG | DIASTOLIC BLOOD PRESSURE: 98 MMHG | TEMPERATURE: 97.9 F | HEIGHT: 71 IN | WEIGHT: 235.6 LBS | HEART RATE: 70 BPM | BODY MASS INDEX: 32.98 KG/M2 | RESPIRATION RATE: 18 BRPM | OXYGEN SATURATION: 95 %

## 2023-07-15 LAB
ANION GAP SERPL CALCULATED.3IONS-SCNC: 11 MMOL/L (ref 7–15)
BUN SERPL-MCNC: 19.9 MG/DL (ref 6–20)
CALCIUM SERPL-MCNC: 8.9 MG/DL (ref 8.6–10)
CHLORIDE SERPL-SCNC: 98 MMOL/L (ref 98–107)
CREAT SERPL-MCNC: 0.99 MG/DL (ref 0.67–1.17)
DEPRECATED HCO3 PLAS-SCNC: 25 MMOL/L (ref 22–29)
GFR SERPL CREATININE-BSD FRML MDRD: 89 ML/MIN/1.73M2
GLUCOSE SERPL-MCNC: 137 MG/DL (ref 70–99)
HGB BLD-MCNC: 14.2 G/DL (ref 13.3–17.7)
POTASSIUM SERPL-SCNC: 3.7 MMOL/L (ref 3.4–5.3)
SODIUM SERPL-SCNC: 134 MMOL/L (ref 136–145)

## 2023-07-15 PROCEDURE — 250N000011 HC RX IP 250 OP 636: Mod: JZ | Performed by: ORTHOPAEDIC SURGERY

## 2023-07-15 PROCEDURE — 97116 GAIT TRAINING THERAPY: CPT | Mod: GP | Performed by: PHYSICAL THERAPIST

## 2023-07-15 PROCEDURE — 36415 COLL VENOUS BLD VENIPUNCTURE: CPT | Performed by: PHYSICIAN ASSISTANT

## 2023-07-15 PROCEDURE — 250N000011 HC RX IP 250 OP 636: Mod: JZ | Performed by: PHYSICIAN ASSISTANT

## 2023-07-15 PROCEDURE — 250N000011 HC RX IP 250 OP 636: Performed by: INTERNAL MEDICINE

## 2023-07-15 PROCEDURE — 258N000003 HC RX IP 258 OP 636: Performed by: PHYSICIAN ASSISTANT

## 2023-07-15 PROCEDURE — 97110 THERAPEUTIC EXERCISES: CPT | Mod: GP | Performed by: PHYSICAL THERAPIST

## 2023-07-15 PROCEDURE — 97165 OT EVAL LOW COMPLEX 30 MIN: CPT | Mod: GO

## 2023-07-15 PROCEDURE — 250N000013 HC RX MED GY IP 250 OP 250 PS 637: Performed by: PHYSICIAN ASSISTANT

## 2023-07-15 PROCEDURE — 250N000013 HC RX MED GY IP 250 OP 250 PS 637: Performed by: ORTHOPAEDIC SURGERY

## 2023-07-15 PROCEDURE — 250N000013 HC RX MED GY IP 250 OP 250 PS 637

## 2023-07-15 PROCEDURE — 80048 BASIC METABOLIC PNL TOTAL CA: CPT

## 2023-07-15 PROCEDURE — 97535 SELF CARE MNGMENT TRAINING: CPT | Mod: GO

## 2023-07-15 PROCEDURE — 85018 HEMOGLOBIN: CPT | Performed by: PHYSICIAN ASSISTANT

## 2023-07-15 PROCEDURE — 97530 THERAPEUTIC ACTIVITIES: CPT | Mod: GP | Performed by: PHYSICAL THERAPIST

## 2023-07-15 RX ORDER — HYDROMORPHONE HYDROCHLORIDE 1 MG/ML
0.5 INJECTION, SOLUTION INTRAMUSCULAR; INTRAVENOUS; SUBCUTANEOUS ONCE
Status: COMPLETED | OUTPATIENT
Start: 2023-07-15 | End: 2023-07-15

## 2023-07-15 RX ORDER — HYDROMORPHONE HYDROCHLORIDE 1 MG/ML
0.5 INJECTION, SOLUTION INTRAMUSCULAR; INTRAVENOUS; SUBCUTANEOUS
Status: DISCONTINUED | OUTPATIENT
Start: 2023-07-15 | End: 2023-07-15 | Stop reason: HOSPADM

## 2023-07-15 RX ORDER — METHOCARBAMOL 500 MG/1
500 TABLET, FILM COATED ORAL 4 TIMES DAILY
Status: DISCONTINUED | OUTPATIENT
Start: 2023-07-15 | End: 2023-07-15 | Stop reason: HOSPADM

## 2023-07-15 RX ORDER — HYDROMORPHONE HYDROCHLORIDE 1 MG/ML
0.3 INJECTION, SOLUTION INTRAMUSCULAR; INTRAVENOUS; SUBCUTANEOUS
Status: DISCONTINUED | OUTPATIENT
Start: 2023-07-15 | End: 2023-07-15 | Stop reason: HOSPADM

## 2023-07-15 RX ORDER — METHOCARBAMOL 500 MG/1
500 TABLET, FILM COATED ORAL 4 TIMES DAILY
Qty: 30 TABLET | Refills: 0 | Status: SHIPPED | OUTPATIENT
Start: 2023-07-15

## 2023-07-15 RX ORDER — KETOROLAC TROMETHAMINE 30 MG/ML
30 INJECTION, SOLUTION INTRAMUSCULAR; INTRAVENOUS EVERY 6 HOURS PRN
Status: DISCONTINUED | OUTPATIENT
Start: 2023-07-15 | End: 2023-07-15 | Stop reason: HOSPADM

## 2023-07-15 RX ORDER — HYDROMORPHONE HYDROCHLORIDE 2 MG/1
2-4 TABLET ORAL
Status: DISCONTINUED | OUTPATIENT
Start: 2023-07-15 | End: 2023-07-15 | Stop reason: HOSPADM

## 2023-07-15 RX ORDER — HYDROMORPHONE HYDROCHLORIDE 2 MG/1
2-4 TABLET ORAL
Qty: 40 TABLET | Refills: 0 | Status: SHIPPED | OUTPATIENT
Start: 2023-07-15

## 2023-07-15 RX ORDER — DIAZEPAM 5 MG
5 TABLET ORAL
COMMUNITY

## 2023-07-15 RX ADMIN — HYDROMORPHONE HYDROCHLORIDE 0.5 MG: 1 INJECTION, SOLUTION INTRAMUSCULAR; INTRAVENOUS; SUBCUTANEOUS at 05:49

## 2023-07-15 RX ADMIN — ASPIRIN 325 MG: 325 TABLET, COATED ORAL at 09:16

## 2023-07-15 RX ADMIN — OXYCODONE HYDROCHLORIDE 10 MG: 10 TABLET ORAL at 07:36

## 2023-07-15 RX ADMIN — HYDROMORPHONE HYDROCHLORIDE 0.2 MG: 0.2 INJECTION, SOLUTION INTRAMUSCULAR; INTRAVENOUS; SUBCUTANEOUS at 00:08

## 2023-07-15 RX ADMIN — SENNOSIDES AND DOCUSATE SODIUM 1 TABLET: 50; 8.6 TABLET ORAL at 09:16

## 2023-07-15 RX ADMIN — KETOROLAC TROMETHAMINE 30 MG: 30 INJECTION, SOLUTION INTRAMUSCULAR; INTRAVENOUS at 16:31

## 2023-07-15 RX ADMIN — METHOCARBAMOL 500 MG: 500 TABLET ORAL at 11:45

## 2023-07-15 RX ADMIN — METHOCARBAMOL 500 MG: 500 TABLET ORAL at 09:14

## 2023-07-15 RX ADMIN — OXYCODONE HYDROCHLORIDE 5 MG: 5 TABLET ORAL at 16:31

## 2023-07-15 RX ADMIN — OXYCODONE HYDROCHLORIDE 10 MG: 10 TABLET ORAL at 11:45

## 2023-07-15 RX ADMIN — HYDROMORPHONE HYDROCHLORIDE 4 MG: 2 TABLET ORAL at 12:39

## 2023-07-15 RX ADMIN — HYDROMORPHONE HYDROCHLORIDE 4 MG: 2 TABLET ORAL at 09:35

## 2023-07-15 RX ADMIN — HYDROXYZINE HYDROCHLORIDE 25 MG: 25 TABLET, FILM COATED ORAL at 13:50

## 2023-07-15 RX ADMIN — SODIUM CHLORIDE, POTASSIUM CHLORIDE, SODIUM LACTATE AND CALCIUM CHLORIDE: 600; 310; 30; 20 INJECTION, SOLUTION INTRAVENOUS at 00:17

## 2023-07-15 RX ADMIN — OXYCODONE HYDROCHLORIDE 10 MG: 10 TABLET ORAL at 02:32

## 2023-07-15 RX ADMIN — CHLORTHALIDONE 25 MG: 25 TABLET ORAL at 09:16

## 2023-07-15 RX ADMIN — CEFAZOLIN SODIUM 2 G: 2 INJECTION, SOLUTION INTRAVENOUS at 00:11

## 2023-07-15 RX ADMIN — LOSARTAN POTASSIUM 50 MG: 50 TABLET, FILM COATED ORAL at 09:16

## 2023-07-15 RX ADMIN — KETOROLAC TROMETHAMINE 30 MG: 30 INJECTION, SOLUTION INTRAMUSCULAR; INTRAVENOUS at 09:30

## 2023-07-15 RX ADMIN — HYDROXYZINE HYDROCHLORIDE 25 MG: 25 TABLET, FILM COATED ORAL at 05:49

## 2023-07-15 RX ADMIN — ACETAMINOPHEN 975 MG: 325 TABLET, FILM COATED ORAL at 15:35

## 2023-07-15 RX ADMIN — HYDROMORPHONE HYDROCHLORIDE 0.4 MG: 0.2 INJECTION, SOLUTION INTRAMUSCULAR; INTRAVENOUS; SUBCUTANEOUS at 03:53

## 2023-07-15 RX ADMIN — POLYETHYLENE GLYCOL 3350 17 G: 17 POWDER, FOR SOLUTION ORAL at 09:18

## 2023-07-15 RX ADMIN — METHOCARBAMOL 500 MG: 500 TABLET ORAL at 15:36

## 2023-07-15 RX ADMIN — ACETAMINOPHEN 975 MG: 325 TABLET, FILM COATED ORAL at 09:15

## 2023-07-15 RX ADMIN — HYDROMORPHONE HYDROCHLORIDE 4 MG: 2 TABLET ORAL at 15:35

## 2023-07-15 ASSESSMENT — ACTIVITIES OF DAILY LIVING (ADL)
ADLS_ACUITY_SCORE: 24
ADLS_ACUITY_SCORE: 24
ADLS_ACUITY_SCORE: 27
PREVIOUS_RESPONSIBILITIES: MEAL PREP;HOUSEKEEPING;LAUNDRY;SHOPPING;MEDICATION MANAGEMENT;FINANCES;DRIVING;WORK
ADLS_ACUITY_SCORE: 27
ADLS_ACUITY_SCORE: 24
ADLS_ACUITY_SCORE: 24
ADLS_ACUITY_SCORE: 27

## 2023-07-15 NOTE — PHARMACY-ADMISSION MEDICATION HISTORY
Admission med rec performed by pre-op nursing and reviewed by Pharmacy.     Note:  - pt confirms verbally to writer the new Rx for olanzapine 10mg at bedtime from 7/12/23. He did pick it up but has NOT started taking (thus did not add to home med list)    [facility]

## 2023-07-15 NOTE — PLAN OF CARE
Occupational Therapy Discharge Summary    Reason for therapy discharge:    All goals and outcomes met, no further needs identified.    Progress towards therapy goal(s). See goals on Care Plan in [facility] electronic health record for goal details.  Goals met    Therapy recommendation(s):    Defer to ortho. Anticipate at discharge pt will be independent in self-cares and require some assist with strenuous IADLs (cleaning, dog care, laundry, etc). Pt confirms he can have this level of assist at discharge

## 2023-07-15 NOTE — PLAN OF CARE
Patient vital signs are at baseline: Yes, on capno monitoring, sats on room air, 90's.  Patient able to ambulate as they were prior to admission or with assist devices provided by therapies during their stay:  Yes, ambulated with gait belt,walker and sba of 1.  CMS intact, dressing dry.   Patient MUST void prior to discharge:  Yes, voided per urinal.  Patient able to tolerate oral intake:  tolerated diet.   Yes, pain controlled  with tylenol, iv dilaudid, vistaril and oxycodone.  Does patient have an identified :  Yes, daughter.  Has goal D/C date and time been discussed with patient:  Yes, plan to discharge to home in am.  Goal Outcome Evaluation:      Plan of Care Reviewed With: patient

## 2023-07-15 NOTE — CONSULTS
Consult received. Chart reviewed.  58 yo M with HTN and bipolar disorder admitted for a right TKA with Dr Gamble.  No periop complications reported.  PTA meds resumed.      Will defer consult as patient is Outpatient status and anticipated to discharge home today.  Happy to see should any acute needs or concerns arise.     [facility]  Hospitalist

## 2023-07-15 NOTE — PLAN OF CARE
Patient vital signs are at baseline: Yes  Patient able to ambulate as they were prior to admission or with assist devices provided by therapies during their stay:  Yes assist x1 with gait belt and walker  Patient MUST void prior to discharge:  Yes in the bathroom   Patient able to tolerate oral intake:  Yes on regular diet; takes PO meds ok  Pain has adequate pain control using Oral analgesics:  No,  Reason:  pt rates his R knee pain as severe despite taking all pain meds when they are scheduled or available; paged MD for a possible new prescription or higher dosage for existing meds; states that Oxycodone does not help with pain  Does patient have an identified :  Yes daughter  Has goal D/C date and time been discussed with patient:  Yes to his daughter; pt lives alone     Pt is A&O x4. VSS. On RA. CMS intact. Dressing CDI. IV SL. CAPNO off at night. Weight bearing as tolerated. Keep seeing pillow under pt's R knee. Multiple education provided to pt about correct leg position. Poorly sleeps between cares complaining of severe pain.

## 2023-07-15 NOTE — CONSULTS
SW received consult for discharge planning.  Spoke with bedside nurse who reports pt shouldn't have any discharge needs.  SW will clear consult.     Denise EASTON, [facility]  Inpatient Care Coordination   [facility]   [phone]

## 2023-07-15 NOTE — PROGRESS NOTES
07/15/23 1415   Appointment Info   Signing Clinician's Name / Credentials (OT) Brittany Dietz OTR/L   Living Environment   People in Home alone   Current Living Arrangements house   Living Environment Comments Pt lives alone, has 3 flights of stairs. Pt going to stay with his daughter after discharge. Reports can have near 24 hr assist at discharge.   Self-Care   Usual Activity Tolerance good   Current Activity Tolerance moderate   Equipment Currently Used at Home   (Gadiel's home has step in shower with built in seat and comfort height toilet. Has access to walker. 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 See EMR for PMH   Existing Precautions/Restrictions no known precautions/restrictions   Cognitive Status Examination   Affect/Mental Status (Cognitive) WFL   Follows Commands follows multi-step commands   Visual Perception   Visual Impairment/Limitations corrective lenses full-time   Sensory   Sensory Comments Pt denies BUE//BLE numbness or tingling   Pain Assessment   Patient Currently in Pain Yes, see Vital Sign flowsheet   Activities of Daily Living   BADL Assessment/Intervention   (Pt able to independently demonstrate toilet transfer, step in shower transfer, donning shorts, grooming standing at sink, ambulation with FWW, bed mobility)   Clinical Impression   Criteria for Skilled Therapeutic Interventions Met (OT) Yes, treatment  indicated   OT Diagnosis Decline function   OT Problem List-Impairments impacting ADL activity tolerance impaired;pain   Assessment of Occupational Performance 1-3 Performance Deficits   Identified Performance Deficits strenuous IADLs, functional mobility   Planned Therapy Interventions (OT) IADL retraining;home program guidelines   Clinical Decision Making Complexity (OT) low complexity   Anticipated Equipment Needs Upon Discharge (OT)   (Pt has)   Risk & Benefits of therapy have been explained evaluation/treatment results reviewed;care plan/treatment goals reviewed;current/potential barriers reviewed;risks/benefits reviewed;participants voiced agreement with care plan;participants included;patient   OT Total Evaluation Time   OT Eval, Low Complexity Minutes (20159) 11   OT Goals   Therapy Frequency (OT) One time eval and treatment   OT Predicted Duration/Target Date for Goal Attainment 07/16/23   OT Goals OT Goal 2;OT Goal 1   OT: Goal 1 Pt will verbalize understanding of fall prevention, how to progress activity tolerance, pain management strategies, walker safety principles, safe item obtainment   OT: Goal 2 Pt will verbalize understanding of vehicle transfer technique   OT Discharge Planning   OT Rationale for DC Rec Defer to ortho. Anticipate at discharge pt will be independent in self-cares and require some assist with strenuous IADLs (cleaning, dog care, laundry, etc). Pt confirms he can have this level of assist at discharge   OT Brief overview of current status Pt demonstrated independence in self-cares. Appears to have good understanding of education provided   Total Session Time   Total Session Time (sum of timed and untimed services)

## 2023-07-15 NOTE — PROGRESS NOTES
No complaints today except pain  Afebrile;  UO adequate;  VSS  Exam:  DF intact  Dressing dry, wound benign  PLAN:   1) PT   2) Anticoagulation w ASA   3) D/C at POD #1    Will try different pain med choices

## 2023-07-15 NOTE — PROGRESS NOTES
Physical Therapy Discharge Summary    Reason for therapy discharge:    All goals and outcomes met, no further needs identified.    Progress towards therapy goal(s). See goals on Care Plan in [facility] electronic health record for goal details.  Goals met adequate for safe discharge    Therapy recommendation(s):    Continued therapy is recommended.  Rationale/Recommendations:  OPPT rec for ROM, strength, gait, and balance.

## 2023-07-15 NOTE — PLAN OF CARE
"BP (!) 164/98 (BP Location: Right arm)   Pulse 80   Temp 97.4  F (36.3  C) (Temporal)   Resp 24   Ht 1.803 m (5' 11\")   Wt 106.9 kg (235 lb 9.6 oz)   SpO2 95%   BMI 32.86 kg/m      Neuro: a/o x4  Cardiac: WNL  Lungs: WNL  GI: WNL  : WNL  Pain: 5-10/10- toradol/oxy/atarax/tobaxin/dilaudid available   IV: saline locked  Meds: tylenol, aspirin  Diet: reg  Activity: assist x1/gb/walker- weight bearing as tolerated  Misc: R knee dressing- CDI. PT/OT following.   Plan: Possible discharge today. Currently resting in bed, able to make need known.     "

## 2023-07-15 NOTE — PROVIDER NOTIFICATION
Pt has been rating his R knee pain as severe at night despite all available and given pain meds. Any other meds possible for prescription or existing pain meds to be increased in dosage? thx!

## 2023-11-07 ENCOUNTER — HOSPITAL ENCOUNTER (EMERGENCY)
Facility: CLINIC | Age: 58
Discharge: HOME | End: 2023-11-07
Attending: EMERGENCY MEDICINE | Admitting: EMERGENCY MEDICINE
Payer: COMMERCIAL

## 2023-11-07 VITALS
OXYGEN SATURATION: 96 % | TEMPERATURE: 97.1 F | RESPIRATION RATE: 13 BRPM | HEART RATE: 87 BPM | DIASTOLIC BLOOD PRESSURE: 113 MMHG | SYSTOLIC BLOOD PRESSURE: 148 MMHG

## 2023-11-07 DIAGNOSIS — F10.90 ALCOHOL USE DISORDER: ICD-10-CM

## 2023-11-07 LAB
ALBUMIN SERPL BCG-MCNC: 4.5 G/DL (ref 3.5–5.2)
ALP SERPL-CCNC: 82 U/L (ref 40–129)
ALT SERPL W P-5'-P-CCNC: 75 U/L (ref 0–70)
ANION GAP SERPL CALCULATED.3IONS-SCNC: 9 MMOL/L (ref 7–15)
AST SERPL W P-5'-P-CCNC: 44 U/L (ref 0–45)
BASOPHILS # BLD AUTO: 0 10E3/UL (ref 0–0.2)
BASOPHILS NFR BLD AUTO: 0 %
BILIRUB SERPL-MCNC: 0.5 MG/DL
BUN SERPL-MCNC: 18.4 MG/DL (ref 6–20)
CALCIUM SERPL-MCNC: 9.6 MG/DL (ref 8.6–10)
CHLORIDE SERPL-SCNC: 97 MMOL/L (ref 98–107)
CREAT SERPL-MCNC: 0.96 MG/DL (ref 0.67–1.17)
DEPRECATED HCO3 PLAS-SCNC: 27 MMOL/L (ref 22–29)
EGFRCR SERPLBLD CKD-EPI 2021: >90 ML/MIN/1.73M2
EOSINOPHIL # BLD AUTO: 0.1 10E3/UL (ref 0–0.7)
EOSINOPHIL NFR BLD AUTO: 1 %
ERYTHROCYTE [DISTWIDTH] IN BLOOD BY AUTOMATED COUNT: 12.7 % (ref 10–15)
ETHANOL SERPL-MCNC: <0.01 G/DL
GLUCOSE SERPL-MCNC: 106 MG/DL (ref 70–99)
HCT VFR BLD AUTO: 45 % (ref 40–53)
HGB BLD-MCNC: 15.8 G/DL (ref 13.3–17.7)
HOLD SPECIMEN: NORMAL
HOLD SPECIMEN: NORMAL
IMM GRANULOCYTES # BLD: 0 10E3/UL
IMM GRANULOCYTES NFR BLD: 0 %
LYMPHOCYTES # BLD AUTO: 2.3 10E3/UL (ref 0.8–5.3)
LYMPHOCYTES NFR BLD AUTO: 21 %
MCH RBC QN AUTO: 30.7 PG (ref 26.5–33)
MCHC RBC AUTO-ENTMCNC: 35.1 G/DL (ref 31.5–36.5)
MCV RBC AUTO: 88 FL (ref 78–100)
MONOCYTES # BLD AUTO: 0.7 10E3/UL (ref 0–1.3)
MONOCYTES NFR BLD AUTO: 6 %
NEUTROPHILS # BLD AUTO: 7.8 10E3/UL (ref 1.6–8.3)
NEUTROPHILS NFR BLD AUTO: 72 %
NRBC # BLD AUTO: 0 10E3/UL
NRBC BLD AUTO-RTO: 0 /100
PLATELET # BLD AUTO: 410 10E3/UL (ref 150–450)
POTASSIUM SERPL-SCNC: 2.9 MMOL/L (ref 3.4–5.3)
PROT SERPL-MCNC: 7.1 G/DL (ref 6.4–8.3)
RBC # BLD AUTO: 5.14 10E6/UL (ref 4.4–5.9)
SODIUM SERPL-SCNC: 133 MMOL/L (ref 135–145)
WBC # BLD AUTO: 10.9 10E3/UL (ref 4–11)

## 2023-11-07 PROCEDURE — 85004 AUTOMATED DIFF WBC COUNT: CPT | Performed by: STUDENT IN AN ORGANIZED HEALTH CARE EDUCATION/TRAINING PROGRAM

## 2023-11-07 PROCEDURE — 82077 ASSAY SPEC XCP UR&BREATH IA: CPT | Performed by: EMERGENCY MEDICINE

## 2023-11-07 PROCEDURE — 80053 COMPREHEN METABOLIC PANEL: CPT | Performed by: STUDENT IN AN ORGANIZED HEALTH CARE EDUCATION/TRAINING PROGRAM

## 2023-11-07 PROCEDURE — 36415 COLL VENOUS BLD VENIPUNCTURE: CPT | Performed by: STUDENT IN AN ORGANIZED HEALTH CARE EDUCATION/TRAINING PROGRAM

## 2023-11-07 PROCEDURE — 93005 ELECTROCARDIOGRAM TRACING: CPT

## 2023-11-07 PROCEDURE — 82077 ASSAY SPEC XCP UR&BREATH IA: CPT | Performed by: STUDENT IN AN ORGANIZED HEALTH CARE EDUCATION/TRAINING PROGRAM

## 2023-11-07 PROCEDURE — 85025 COMPLETE CBC W/AUTO DIFF WBC: CPT | Performed by: EMERGENCY MEDICINE

## 2023-11-07 PROCEDURE — 99284 EMERGENCY DEPT VISIT MOD MDM: CPT

## 2023-11-07 PROCEDURE — 80053 COMPREHEN METABOLIC PANEL: CPT | Performed by: EMERGENCY MEDICINE

## 2023-11-07 PROCEDURE — 250N000013 HC RX MED GY IP 250 OP 250 PS 637: Performed by: EMERGENCY MEDICINE

## 2023-11-07 RX ORDER — POTASSIUM CHLORIDE 1.5 G/1.58G
40 POWDER, FOR SOLUTION ORAL ONCE
Status: COMPLETED | OUTPATIENT
Start: 2023-11-07 | End: 2023-11-07

## 2023-11-07 RX ORDER — CHLORDIAZEPOXIDE HYDROCHLORIDE 25 MG/1
25 CAPSULE, GELATIN COATED ORAL 4 TIMES DAILY PRN
Qty: 20 CAPSULE | Refills: 0 | Status: SHIPPED | OUTPATIENT
Start: 2023-11-07 | End: 2023-11-12

## 2023-11-07 RX ORDER — CHLORDIAZEPOXIDE HYDROCHLORIDE 25 MG/1
25 CAPSULE, GELATIN COATED ORAL ONCE
Status: COMPLETED | OUTPATIENT
Start: 2023-11-07 | End: 2023-11-07

## 2023-11-07 RX ADMIN — POTASSIUM CHLORIDE 40 MEQ: 1.5 POWDER, FOR SOLUTION ORAL at 19:36

## 2023-11-07 RX ADMIN — CHLORDIAZEPOXIDE HYDROCHLORIDE 25 MG: 25 CAPSULE ORAL at 19:39

## 2023-11-07 ASSESSMENT — ACTIVITIES OF DAILY LIVING (ADL): ADLS_ACUITY_SCORE: 35

## 2023-11-07 NOTE — ED TRIAGE NOTES
Patient presents to the ED requesting help with alcohol. States has been a daily drinker and is having issues with withdrawal. Last drink last night.

## 2023-11-08 LAB
ATRIAL RATE - MUSE: 85 BPM
DIASTOLIC BLOOD PRESSURE - MUSE: NORMAL MMHG
INTERPRETATION ECG - MUSE: NORMAL
P AXIS - MUSE: 36 DEGREES
PR INTERVAL - MUSE: 140 MS
QRS DURATION - MUSE: 114 MS
QT - MUSE: 390 MS
QTC - MUSE: 464 MS
R AXIS - MUSE: 75 DEGREES
SYSTOLIC BLOOD PRESSURE - MUSE: NORMAL MMHG
T AXIS - MUSE: 72 DEGREES
VENTRICULAR RATE- MUSE: 85 BPM

## 2023-11-08 NOTE — ED PROVIDER NOTES
"  History     Chief Complaint:  Alcohol Problem     The history is provided by the patient.      Olman Condon is a 57 year old male with history of alcohol withdrawal, schizophrenia, bipolar II disorder, hypertension, and insomnia who presents to the ED for evaluation of alcohol withdrawal. Olman reports he has been consuming 5 alcoholic drinks with dinner daily for the past 4 years. He is currently seeking to withdraw safely into remission. His last drink was last night. He states he is diaphoretic every night, has intermittent issues with \"mental acuity\", has been awake for days, has abdominal pain, and diarrhea without black or bloody stools. He asserts \"I'm going to crash\" and is demanding Klonopin. No clonazepam since 2021. He has a history of a grand mal seizure after tapering off Klonopin (for facial tics) for 6 months in 2014. He endorses chewing tobacco and marijuana use daily. He used cocaine from 4541-6875. He mentions a period of alcohol sobriety 2566-7476, though he states he was not drinking heavily prior to that. History of alcoholic pancreatitis. Of note, he was recently on antibiotics and steroids for URI.     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, left  Alcohol use disorder with withdrawal without complication  Lumbar disc disease  LEXUS  Mild intermittent asthma  Insomnia  Hypertension  Chronic midline low back pain with sciatrica  Osteoarthritis of knee  Facial tic  Extrapyramidal disease and abnormal movement disorder  Concussions    Past Surgical History:    Appendectomy  Left hip replacement  Bilateral knee replacement  Lumbar laminectomy L4-L5    Physical Exam   Patient Vitals for the past 24 hrs:   BP Temp Temp src Pulse Resp SpO2 "   11/07/23 1855 -- -- -- 87 13 96 %   11/07/23 1848 -- -- -- 86 22 96 %   11/07/23 1843 -- -- -- 86 16 97 %   11/07/23 1840 -- -- -- 90 18 97 %   11/07/23 1835 (!) 143/109 -- -- 103 30 92 %   11/07/23 1415 (!) 169/109 97.1  F (36.2  C) Temporal 95 16 97 %      Physical Exam  Constitutional: Patient is well appearing. No distress.  Head: Atraumatic.  Mouth/Throat: Oropharynx is clear and moist. No oropharyngeal exudate.  Eyes: Conjunctivae and EOM are normal. No scleral icterus.  Neck: Normal range of motion. Neck supple.   Cardiovascular: Normal rate, regular rhythm, normal heart sounds and intact distal perfusion.   Pulmonary/Chest: Breath sounds normal. No respiratory distress.  Abdominal: Soft. Bowel sounds are normal. No distension. No tenderness. No rebound or guarding.   Musculoskeletal: Normal range of motion. No edema or tenderness.   Neurological: Alert and orientated to person, place, and time. No observable focal neuro deficit.  No tremor no nystagmus no mental clouding.   Skin: Warm and dry. No rash noted. Not diaphoretic.      Emergency Department Course   ECG      Laboratory:  Labs Ordered and Resulted from Time of ED Arrival to Time of ED Departure   COMPREHENSIVE METABOLIC PANEL - Abnormal       Result Value    Sodium 133 (*)     Potassium 2.9 (*)     Carbon Dioxide (CO2) 27      Anion Gap 9      Urea Nitrogen 18.4      Creatinine 0.96      GFR Estimate >90      Calcium 9.6      Chloride 97 (*)     Glucose 106 (*)     Alkaline Phosphatase 82      AST 44      ALT 75 (*)     Protein Total 7.1      Albumin 4.5      Bilirubin Total 0.5     ETHYL ALCOHOL LEVEL - Normal    Alcohol ethyl <0.01     CBC WITH PLATELETS AND DIFFERENTIAL    WBC Count 10.9      RBC Count 5.14      Hemoglobin 15.8      Hematocrit 45.0      MCV 88      MCH 30.7      MCHC 35.1      RDW 12.7      Platelet Count 410      % Neutrophils 72      % Lymphocytes 21      % Monocytes 6      % Eosinophils 1      % Basophils 0      % Immature  Granulocytes 0      NRBCs per 100 WBC 0      Absolute Neutrophils 7.8      Absolute Lymphocytes 2.3      Absolute Monocytes 0.7      Absolute Eosinophils 0.1      Absolute Basophils 0.0      Absolute Immature Granulocytes 0.0      Absolute NRBCs 0.0       Procedures  None    Emergency Department Course & Assessments:    Interventions:  Medications   potassium chloride (KLOR-CON) Packet 40 mEq (40 mEq Oral $Given 11/7/23 1936)   chlordiazePOXIDE (LIBRIUM) capsule 25 mg (25 mg Oral $Given 11/7/23 1939)        Assessments:  1900 My medical student, Mushtaq, obtained history and examined the patient.     Independent Interpretation (X-rays, CTs, rhythm strip):  None    Consultations/Discussion of Management or Tests:  None    Social Determinants of Health affecting care:   Stress/Adjustment Disorders    Disposition:  The patient was discharged to home.     Impression & Plan      Medical Decision Making:  Pt is not in florid withdrawal has normal liver function and counts oral potassium offered and given librium.  He states already contacted insurance employer and the retreat in [place] with a bed waiting for him.  No sz.  No recent BZD.  Is safe for discharge.  No psychosis SI HI AH VH.  Will prescribe librium 20 min of alcohol counseling and safety instructions on librium.      Diagnosis:    ICD-10-CM    1. Alcohol use disorder  F10.90           Discharge Medications:  New Prescriptions    No medications on file        Scribe Disclosure:  Tan SOSA Hailie, am serving as a scribe at 7:34 PM on 11/7/2023 to document services personally performed by Jean Marie Magana MD based on my observations and the provider's statements to me.   11/7/2023   Jean Marie Magana MD Stevens, Andrew C, MD  11/07/23 1954

## (undated) DEVICE — GLOVE BIOGEL PI MICRO SZ 8.5 48585

## (undated) DEVICE — SU ETHIBOND 0 CT-1 CR 8X18" CX21D

## (undated) DEVICE — SOL NACL 0.9% IRRIG 3000ML BAG 2B7477

## (undated) DEVICE — GLOVE BIOGEL PI MICRO INDICATOR UNDERGLOVE SZ 8.5 48985

## (undated) DEVICE — BAG CLEAR TRASH 1.3M 39X33" P4040C

## (undated) DEVICE — GLOVE BIOGEL PI SZ 8.5 40885

## (undated) DEVICE — BLADE SAW SAGITTAL STRK 13X90X1.27MM HD SYS 6 6113-127-090

## (undated) DEVICE — NDL BLUNT 18GA 1" W/O FILTER 305181

## (undated) DEVICE — SU VICRYL 2-0 CT-1 27" UND J259H

## (undated) DEVICE — SET HANDPIECE INTERPULSE W/COAXIAL FAN SPRAY TIP 0210118000

## (undated) DEVICE — PACK TOTAL KNEE BOXED LATEX FREE PO15TKFCT

## (undated) DEVICE — SUCTION MANIFOLD NEPTUNE 2 SYS 4 PORT 0702-020-000

## (undated) DEVICE — SYR 03ML LL W/O NDL 309657

## (undated) DEVICE — GLOVE BIOGEL PI SZ 7.0 40870

## (undated) DEVICE — TOURNIQUET SGL BLADDER 34"X4" PURPLE 5921-034-135

## (undated) DEVICE — LINEN FULL SHEET 5511

## (undated) DEVICE — DRSG AQUACEL AG HYDROFIBER  3.5X10" 422605

## (undated) DEVICE — SU VICRYL 1 CT-1 27" J341H

## (undated) DEVICE — DECANTER VIAL 2006S

## (undated) DEVICE — GLOVE PROTEXIS W/NEU-THERA 7.0  2D73TE70

## (undated) DEVICE — BONE CEMENT MIXEVAC III HI VAC KIT  0206-015-000

## (undated) DEVICE — CAST PADDING 6" STERILE 9046S

## (undated) DEVICE — GLOVE BIOGEL PI MICRO INDICATOR UNDERGLOVE SZ 7.0 48970

## (undated) DEVICE — BLADE SAW SAGITTAL STRK 25X90X1.27MM HD SYS 6 6125-127-090

## (undated) DEVICE — LINEN ORTHO ACL PACK 5447

## (undated) DEVICE — PREP CHLORAPREP 26ML TINTED HI-LITE ORANGE 930815

## (undated) RX ORDER — OXYCODONE HYDROCHLORIDE 5 MG/1
TABLET ORAL
Status: DISPENSED
Start: 2023-07-14

## (undated) RX ORDER — HYDROMORPHONE HYDROCHLORIDE 1 MG/ML
INJECTION, SOLUTION INTRAMUSCULAR; INTRAVENOUS; SUBCUTANEOUS
Status: DISPENSED
Start: 2023-07-14

## (undated) RX ORDER — HYDROMORPHONE HCL IN WATER/PF 6 MG/30 ML
PATIENT CONTROLLED ANALGESIA SYRINGE INTRAVENOUS
Status: DISPENSED
Start: 2023-07-14

## (undated) RX ORDER — HYDROXYZINE HYDROCHLORIDE 25 MG/1
TABLET, FILM COATED ORAL
Status: DISPENSED
Start: 2023-07-14

## (undated) RX ORDER — DEXMEDETOMIDINE HYDROCHLORIDE 4 UG/ML
INJECTION, SOLUTION INTRAVENOUS
Status: DISPENSED
Start: 2023-07-14

## (undated) RX ORDER — TRANEXAMIC ACID 10 MG/ML
INJECTION, SOLUTION INTRAVENOUS
Status: DISPENSED
Start: 2023-07-14

## (undated) RX ORDER — ACETAMINOPHEN 325 MG/1
TABLET ORAL
Status: DISPENSED
Start: 2023-07-14

## (undated) RX ORDER — EPINEPHRINE 1 MG/ML(1)
AMPUL (ML) INJECTION
Status: DISPENSED
Start: 2023-07-14

## (undated) RX ORDER — FENTANYL CITRATE 50 UG/ML
INJECTION, SOLUTION INTRAMUSCULAR; INTRAVENOUS
Status: DISPENSED
Start: 2023-07-14

## (undated) RX ORDER — VANCOMYCIN HYDROCHLORIDE 1 G/20ML
INJECTION, POWDER, LYOPHILIZED, FOR SOLUTION INTRAVENOUS
Status: DISPENSED
Start: 2023-07-14

## (undated) RX ORDER — TRANEXAMIC ACID 650 MG/1
TABLET ORAL
Status: DISPENSED
Start: 2023-07-14

## (undated) RX ORDER — CEFAZOLIN SODIUM/WATER 2 G/20 ML
SYRINGE (ML) INTRAVENOUS
Status: DISPENSED
Start: 2023-07-14